# Patient Record
Sex: FEMALE | Race: OTHER | Employment: FULL TIME | URBAN - METROPOLITAN AREA
[De-identification: names, ages, dates, MRNs, and addresses within clinical notes are randomized per-mention and may not be internally consistent; named-entity substitution may affect disease eponyms.]

---

## 2017-03-22 ENCOUNTER — HOSPITAL ENCOUNTER (EMERGENCY)
Facility: HOSPITAL | Age: 37
Discharge: HOME | End: 2017-03-22
Attending: EMERGENCY MEDICINE
Payer: COMMERCIAL

## 2017-03-22 ENCOUNTER — APPOINTMENT (OUTPATIENT)
Dept: CT IMAGING | Facility: HOSPITAL | Age: 37
End: 2017-03-22
Attending: EMERGENCY MEDICINE
Payer: COMMERCIAL

## 2017-03-22 VITALS
BODY MASS INDEX: 23.39 KG/M2 | OXYGEN SATURATION: 99 % | WEIGHT: 137 LBS | HEART RATE: 83 BPM | DIASTOLIC BLOOD PRESSURE: 76 MMHG | HEIGHT: 64 IN | RESPIRATION RATE: 18 BRPM | SYSTOLIC BLOOD PRESSURE: 125 MMHG | TEMPERATURE: 101 F

## 2017-03-22 DIAGNOSIS — N30.91 HEMORRHAGIC CYSTITIS: Primary | ICD-10-CM

## 2017-03-22 LAB
B-HCG UR QL: NEGATIVE
BILIRUB UR QL: NEGATIVE
NITRITE UR QL STRIP.AUTO: POSITIVE
PH UR: 6 [PH] (ref 5–8)
PROT UR-MCNC: 100 MG/DL
RBC #/AREA URNS AUTO: ABNORMAL /HPF
SP GR UR STRIP: 1.02 (ref 1–1.03)
UROBILINOGEN UR STRIP-ACNC: <2
VIT C UR-MCNC: NEGATIVE MG/DL
WBC #/AREA URNS AUTO: 772 /HPF

## 2017-03-22 PROCEDURE — 87088 URINE BACTERIA CULTURE: CPT | Performed by: EMERGENCY MEDICINE

## 2017-03-22 PROCEDURE — 81025 URINE PREGNANCY TEST: CPT

## 2017-03-22 PROCEDURE — 81001 URINALYSIS AUTO W/SCOPE: CPT | Performed by: EMERGENCY MEDICINE

## 2017-03-22 PROCEDURE — 74176 CT ABD & PELVIS W/O CONTRAST: CPT

## 2017-03-22 PROCEDURE — 87086 URINE CULTURE/COLONY COUNT: CPT | Performed by: EMERGENCY MEDICINE

## 2017-03-22 PROCEDURE — 99284 EMERGENCY DEPT VISIT MOD MDM: CPT

## 2017-03-22 PROCEDURE — 87186 SC STD MICRODIL/AGAR DIL: CPT | Performed by: EMERGENCY MEDICINE

## 2017-03-22 RX ORDER — PHENAZOPYRIDINE HYDROCHLORIDE 100 MG/1
200 TABLET, FILM COATED ORAL 3 TIMES DAILY PRN
Qty: 6 TABLET | Refills: 0 | Status: SHIPPED | OUTPATIENT
Start: 2017-03-22 | End: 2017-03-29

## 2017-03-22 RX ORDER — SULFAMETHOXAZOLE AND TRIMETHOPRIM 800; 160 MG/1; MG/1
1 TABLET ORAL 2 TIMES DAILY
Qty: 14 TABLET | Refills: 0 | Status: SHIPPED | OUTPATIENT
Start: 2017-03-22 | End: 2017-04-01

## 2017-03-22 NOTE — ED PROVIDER NOTES
Patient Seen in: [facility] AND [facility] Emergency Department    History   Patient presents with:  Urinary Symptoms (urologic)    Stated Complaint: Blood in urination    HPI    Urinary urgency, burning and hematuria that began tonight. No fever. No vomiting.  Pratibha Lopes light.   Neck: Normal range of motion. Neck supple. Cardiovascular: Normal rate, regular rhythm, normal heart sounds and intact distal pulses. Pulmonary/Chest: Effort normal and breath sounds normal.   Abdominal: Soft.  Bowel sounds are normal. She exh

## 2017-03-28 ENCOUNTER — TELEPHONE (OUTPATIENT)
Dept: INTERNAL MEDICINE CLINIC | Facility: CLINIC | Age: 37
End: 2017-03-28

## 2017-03-28 NOTE — TELEPHONE ENCOUNTER
patient was in er last week for UTI symptoms  Was given medication she is all done she feel she still has the infection can she come in in Thursday to leave a urine sample ?  Please advice

## 2017-04-10 ENCOUNTER — TELEPHONE (OUTPATIENT)
Dept: INTERNAL MEDICINE CLINIC | Facility: CLINIC | Age: 37
End: 2017-04-10

## 2017-04-10 ENCOUNTER — OFFICE VISIT (OUTPATIENT)
Dept: INTERNAL MEDICINE CLINIC | Facility: CLINIC | Age: 37
End: 2017-04-10

## 2017-04-10 VITALS
TEMPERATURE: 98 F | DIASTOLIC BLOOD PRESSURE: 71 MMHG | BODY MASS INDEX: 28.33 KG/M2 | HEIGHT: 61.5 IN | WEIGHT: 152 LBS | HEART RATE: 77 BPM | SYSTOLIC BLOOD PRESSURE: 126 MMHG

## 2017-04-10 DIAGNOSIS — F32.A DEPRESSION, UNSPECIFIED DEPRESSION TYPE: ICD-10-CM

## 2017-04-10 DIAGNOSIS — R39.9 UTI SYMPTOMS: Primary | ICD-10-CM

## 2017-04-10 PROCEDURE — 81002 URINALYSIS NONAUTO W/O SCOPE: CPT | Performed by: INTERNAL MEDICINE

## 2017-04-10 PROCEDURE — 99214 OFFICE O/P EST MOD 30 MIN: CPT | Performed by: INTERNAL MEDICINE

## 2017-04-10 PROCEDURE — 99213 OFFICE O/P EST LOW 20 MIN: CPT | Performed by: INTERNAL MEDICINE

## 2017-04-10 RX ORDER — CITALOPRAM 10 MG/1
10 TABLET ORAL DAILY
Qty: 30 TABLET | Refills: 1 | Status: SHIPPED | OUTPATIENT
Start: 2017-04-10 | End: 2017-05-10

## 2017-04-10 RX ORDER — CEPHALEXIN 500 MG/1
500 CAPSULE ORAL
Refills: 0 | COMMUNITY
Start: 2017-03-24 | End: 2017-04-10 | Stop reason: ALTCHOICE

## 2017-04-10 NOTE — PROGRESS NOTES
HPI:   Gurpreet Yu is a 39year old female who presents for a complete physical exam. She states that she was in ed due to urinary infection    Wt Readings from Last 3 Encounters:  04/10/17 : 152 lb (68.947 kg)  03/22/17 : 137 lb (62.143 kg)  10/03/16 77  Temp(Src) 98 °F (36.7 °C) (Oral)  Ht 5' 1.5\" (1.562 m)  Wt 152 lb (68.947 kg)  BMI 28.26 kg/m2  LMP 03/11/2017  Body mass index is 28.26 kg/(m^2). Constitutional Normal Well developed.    Eyes Normal Pupil - Right: Normal, Left: Normal.   Ears Norm

## 2017-04-10 NOTE — PATIENT INSTRUCTIONS
Depression: Tips to Help Yourself  As your health care providers help treat your depression, you can also help yourself. Keep in mind that your illness affects you emotionally, physically, mentally, and socially. So full recovery will take time.  Take car © 8496-7095 The [address] [place] [address], [address] [place]. All rights reserved. This information is not intended as a substitute for professional medical care. Always follow your healthcare professional's instructions.         Depress · Take care of your physical body. Eat a balanced diet (low in saturated fat and high in fruits and vegetables). Exercise at least 3 times a week for 30 minutes. Even mild-moderate exercise (like brisk walking) can make you feel better.   · Avoid alcohol, w © 6308-7292 [address], [address] [place]. All rights reserved. This information is not intended as a substitute for professional medical care. Always follow your healthcare professional's instructions.

## 2017-04-10 NOTE — PROGRESS NOTES
HPI:    Patient ID: Anthony Franco is a 39year old female. HPI she came in today for follow-up on her UTI symptoms as well as some complaining of depression.       She states that the few weeks ago she was seen in the ED because of a urinary infection Negative for adenopathy. Does not bruise/bleed easily. Psychiatric/Behavioral: Negative for hallucinations, behavioral problems, confusion, sleep disturbance and agitation. The patient is not nervous/anxious.          Depressed           Current Outpatien present. No thyroid mass and no thyromegaly present. Cardiovascular: Normal rate, regular rhythm, normal heart sounds and intact distal pulses. Exam reveals no gallop and no friction rub. No murmur heard.   Pulmonary/Chest: Effort normal and breath so Referrals:  None        XT#[id number]

## 2017-08-04 ENCOUNTER — HOSPITAL ENCOUNTER (OUTPATIENT)
Dept: GENERAL RADIOLOGY | Facility: HOSPITAL | Age: 37
Discharge: HOME | End: 2017-08-04
Attending: INTERNAL MEDICINE
Payer: COMMERCIAL

## 2017-08-04 ENCOUNTER — OFFICE VISIT (OUTPATIENT)
Dept: INTERNAL MEDICINE CLINIC | Facility: CLINIC | Age: 37
End: 2017-08-04

## 2017-08-04 VITALS
DIASTOLIC BLOOD PRESSURE: 77 MMHG | HEART RATE: 65 BPM | SYSTOLIC BLOOD PRESSURE: 122 MMHG | BODY MASS INDEX: 28.33 KG/M2 | WEIGHT: 152 LBS | TEMPERATURE: 98 F | HEIGHT: 61.5 IN

## 2017-08-04 DIAGNOSIS — G44.51 HEMICRANIA CONTINUA: Primary | ICD-10-CM

## 2017-08-04 DIAGNOSIS — G44.51 HEMICRANIA CONTINUA: ICD-10-CM

## 2017-08-04 PROCEDURE — 70210 X-RAY EXAM OF SINUSES: CPT | Performed by: INTERNAL MEDICINE

## 2017-08-04 PROCEDURE — 99212 OFFICE O/P EST SF 10 MIN: CPT | Performed by: INTERNAL MEDICINE

## 2017-08-04 PROCEDURE — 99214 OFFICE O/P EST MOD 30 MIN: CPT | Performed by: INTERNAL MEDICINE

## 2017-08-04 RX ORDER — NAPROXEN 500 MG/1
500 TABLET ORAL 2 TIMES DAILY WITH MEALS
Qty: 60 TABLET | Refills: 0 | Status: ON HOLD | OUTPATIENT
Start: 2017-08-04 | End: 2018-12-18

## 2017-08-04 NOTE — PROGRESS NOTES
HPI:    Patient ID: Kathi Altamriano is a 40year old female. HPI she came today complaining of headache.   She states that for last couple of months she is having on and off his headaches usually comes before her periods   Lasts a few days and then goes light-headedness, numbness and headaches. Hematological: Negative for adenopathy. Does not bruise/bleed easily. Psychiatric/Behavioral: Negative for agitation, behavioral problems, confusion, hallucinations and sleep disturbance.  The patient is not ner motion. Neck supple. No JVD present. No tracheal tenderness present. No tracheal deviation present. No thyroid mass and no thyromegaly present. Cardiovascular: Normal rate, regular rhythm, normal heart sounds and intact distal pulses.   Exam reveals no ga

## 2017-08-04 NOTE — PATIENT INSTRUCTIONS
Cómo cuidarse los princess de [place]  La mayoría de los princess de [place] no son serios y pueden aliviarse cuidándose usted mismo.  Buddy, algunos pueden ser señal de otros problemas, diane trastornos de la vista o jarrod presión arterial. Para encontrar el me · Trate de identificar qué cosas le desencadenan el dolor de yaz o cierto patrón similar en cada ocasión  Signos de un dolor de yaz por tensión  Cualquiera de las siguientes cosas pueden ser signos:  · Dolor sordo o sensación de presión diane si tuvie Un dolor de yaz puede deberse a muchas cosas. No queda linda a qué se debe pearson dolor de yaz. Buddy no parece ser signo de ninguna enfermedad grave. Puede que tenga un dolor de yaz tensional o un dolor de yaz por migraña.   El estrés puede causar · Si tiene náuseas o vómito, siga kain dieta liviana hasta tanto el dolor de yaz se [facility] [place] [facility].   · Si tiene un dolor de yaz por migraña, use lentes de sol cuando salga a la belkys del día o se encuentre en lugares interiores con iluminación brillante, hasta

## 2017-09-18 ENCOUNTER — TELEPHONE (OUTPATIENT)
Dept: INTERNAL MEDICINE CLINIC | Facility: CLINIC | Age: 37
End: 2017-09-18

## 2017-09-18 NOTE — TELEPHONE ENCOUNTER
Pt c/o sore throat 3 days and headache. States sore throat has gotten worse. Pt denies any fevers, cough, or SOB, or CP. Pt is in no apparent distress. States has drank teas to help sooth throat, but is no longer helping.  Appt offered, but pt denied state

## 2017-09-18 NOTE — TELEPHONE ENCOUNTER
Spoke to pt. Relayed pt of Dr. Blanco Setting message as shown below. Pt educated on route, dose and frequency of medication. Pt verbalized understanding of whole message and had no further questions at this time.

## 2017-09-18 NOTE — TELEPHONE ENCOUNTER
Pt been having a sore throat for 3 days now pt is also complaining of a headache like in rx called in.

## 2017-09-18 NOTE — TELEPHONE ENCOUNTER
I send amoxiclav take as directed with food til done. Take probiotics while on antibiotics if can to prevent yeast infections. Stop cholesterol medicines while on antibiotics. Increase fluids. Use extra form of protection while on antibiotics.  Antibiotics

## 2018-07-21 ENCOUNTER — HOSPITAL ENCOUNTER (OUTPATIENT)
Dept: GENERAL RADIOLOGY | Facility: HOSPITAL | Age: 38
Discharge: HOME | End: 2018-07-21
Attending: INTERNAL MEDICINE
Payer: COMMERCIAL

## 2018-07-21 ENCOUNTER — OFFICE VISIT (OUTPATIENT)
Dept: INTERNAL MEDICINE CLINIC | Facility: CLINIC | Age: 38
End: 2018-07-21

## 2018-07-21 ENCOUNTER — TELEPHONE (OUTPATIENT)
Dept: INTERNAL MEDICINE CLINIC | Facility: CLINIC | Age: 38
End: 2018-07-21

## 2018-07-21 VITALS
TEMPERATURE: 98 F | HEART RATE: 72 BPM | WEIGHT: 154 LBS | DIASTOLIC BLOOD PRESSURE: 62 MMHG | OXYGEN SATURATION: 98 % | BODY MASS INDEX: 28.7 KG/M2 | HEIGHT: 61.5 IN | SYSTOLIC BLOOD PRESSURE: 104 MMHG

## 2018-07-21 DIAGNOSIS — N93.8 DUB (DYSFUNCTIONAL UTERINE BLEEDING): ICD-10-CM

## 2018-07-21 DIAGNOSIS — M54.50 ACUTE LEFT-SIDED LOW BACK PAIN WITHOUT SCIATICA: ICD-10-CM

## 2018-07-21 DIAGNOSIS — R19.7 DIARRHEA, UNSPECIFIED TYPE: Primary | ICD-10-CM

## 2018-07-21 LAB
ALBUMIN SERPL BCP-MCNC: 4.1 G/DL (ref 3.5–4.8)
ALBUMIN/GLOB SERPL: 1.2 {RATIO} (ref 1–2)
ALP SERPL-CCNC: 69 U/L (ref 32–100)
ALT SERPL-CCNC: 26 U/L (ref 14–54)
ANION GAP SERPL CALC-SCNC: 7 MMOL/L (ref 0–18)
AST SERPL-CCNC: 27 U/L (ref 15–41)
BASOPHILS # BLD: 0.1 K/UL (ref 0–0.2)
BASOPHILS NFR BLD: 1 %
BILIRUB SERPL-MCNC: 0.4 MG/DL (ref 0.3–1.2)
BUN SERPL-MCNC: 8 MG/DL (ref 8–20)
BUN/CREAT SERPL: 10.5 (ref 10–20)
CALCIUM SERPL-MCNC: 9.2 MG/DL (ref 8.5–10.5)
CHLORIDE SERPL-SCNC: 107 MMOL/L (ref 95–110)
CO2 SERPL-SCNC: 26 MMOL/L (ref 22–32)
CREAT SERPL-MCNC: 0.76 MG/DL (ref 0.5–1.5)
EOSINOPHIL # BLD: 0 K/UL (ref 0–0.7)
EOSINOPHIL NFR BLD: 1 %
ERYTHROCYTE [DISTWIDTH] IN BLOOD BY AUTOMATED COUNT: 15.7 % (ref 11–15)
FSH SERPL-ACNC: 9.9 MIU/ML
GLOBULIN PLAS-MCNC: 3.4 G/DL (ref 2.5–3.7)
GLUCOSE SERPL-MCNC: 73 MG/DL (ref 70–99)
HCT VFR BLD AUTO: 37.2 % (ref 35–48)
HGB BLD-MCNC: 11.8 G/DL (ref 12–16)
INR BLD: 1 (ref 0.9–1.2)
LH SERPL-ACNC: 4.9 MIU/ML
LYMPHOCYTES # BLD: 1.5 K/UL (ref 1–4)
LYMPHOCYTES NFR BLD: 22 %
MCH RBC QN AUTO: 25.3 PG (ref 27–32)
MCHC RBC AUTO-ENTMCNC: 31.8 G/DL (ref 32–37)
MCV RBC AUTO: 79.5 FL (ref 80–100)
MONOCYTES # BLD: 0.5 K/UL (ref 0–1)
MONOCYTES NFR BLD: 7 %
NEUTROPHILS # BLD AUTO: 4.7 K/UL (ref 1.8–7.7)
NEUTROPHILS NFR BLD: 69 %
OSMOLALITY UR CALC.SUM OF ELEC: 287 MOSM/KG (ref 275–295)
PATIENT FASTING: NO
PLATELET # BLD AUTO: 294 K/UL (ref 140–400)
PMV BLD AUTO: 9.1 FL (ref 7.4–10.3)
POTASSIUM SERPL-SCNC: 4 MMOL/L (ref 3.3–5.1)
PROT SERPL-MCNC: 7.5 G/DL (ref 5.9–8.4)
PROTHROMBIN TIME: 13.3 SECONDS (ref 11.8–14.5)
RBC # BLD AUTO: 4.68 M/UL (ref 3.7–5.4)
SODIUM SERPL-SCNC: 140 MMOL/L (ref 136–144)
TSH SERPL-ACNC: 2.18 UIU/ML (ref 0.45–5.33)
WBC # BLD AUTO: 6.8 K/UL (ref 4–11)

## 2018-07-21 PROCEDURE — 72100 X-RAY EXAM L-S SPINE 2/3 VWS: CPT | Performed by: INTERNAL MEDICINE

## 2018-07-21 PROCEDURE — 99212 OFFICE O/P EST SF 10 MIN: CPT | Performed by: INTERNAL MEDICINE

## 2018-07-21 PROCEDURE — 96372 THER/PROPH/DIAG INJ SC/IM: CPT | Performed by: INTERNAL MEDICINE

## 2018-07-21 PROCEDURE — 99214 OFFICE O/P EST MOD 30 MIN: CPT | Performed by: INTERNAL MEDICINE

## 2018-07-21 PROCEDURE — 36415 COLL VENOUS BLD VENIPUNCTURE: CPT | Performed by: INTERNAL MEDICINE

## 2018-07-21 RX ORDER — CYCLOBENZAPRINE HCL 10 MG
10 TABLET ORAL NIGHTLY
Qty: 5 TABLET | Refills: 0 | Status: SHIPPED | OUTPATIENT
Start: 2018-07-21 | End: 2018-07-26

## 2018-07-21 RX ORDER — KETOROLAC TROMETHAMINE 30 MG/ML
30 INJECTION, SOLUTION INTRAMUSCULAR; INTRAVENOUS ONCE
Status: COMPLETED | OUTPATIENT
Start: 2018-07-21 | End: 2018-07-21

## 2018-07-21 RX ORDER — KETOROLAC TROMETHAMINE 10 MG/1
10 TABLET, FILM COATED ORAL EVERY 8 HOURS
Qty: 15 TABLET | Refills: 0 | Status: ON HOLD | OUTPATIENT
Start: 2018-07-21 | End: 2018-12-18

## 2018-07-21 RX ADMIN — KETOROLAC TROMETHAMINE 30 MG: 30 INJECTION, SOLUTION INTRAMUSCULAR; INTRAVENOUS at 12:39:00

## 2018-07-21 NOTE — TELEPHONE ENCOUNTER
Pain c/o low pain pain on the Lt side, difficulties breathing due to the back pain, menstrual cycle started on 07/16 ans lasts 6 days but instead blood flow is still very heavy and she she should almost be done.  Stated that last night she was excessively b

## 2018-07-21 NOTE — PATIENT INSTRUCTIONS
ASSESSMENT/PLAN:   Diarrhea, unspecified type  (primary encounter diagnosis)Use immodium as needed. But careful not too much. Wash hands thoroughly. BRAT (Bananas, rice apples, tea, bread, etc). [place] diet. Avoid spicey foods. Avoid dairy for 1 week.  OK fo los síntomas del dolor de espalda crónico:  · Mantenga un peso saludable. Si tiene sobrepeso, perder [employer] a aliviar la mayoría de los princess de [place].   · El ejercicio es kain parte importante de la recuperación de los princess de espalda. La es alguna vez kain úlcera estomacal o sangrado gastrointestinal, consulte con pearson médico antes de simon estos medicamentos. ]     Estiramiento Lumbar  Josee es un ejercicio simple de estiramiento que le ayudará a relajar los espasmos musculares y a mantener la es dobladas. Apoye la yaz en kain almohada baja para jewell soporte al dorinda de forma que la columna vertebral quede en posición neutral. Evite las almohadas demasiado gruesas que doblan el dorinda hacia un lado.  Ponga kain OUMOU's duke Waldron naturales  La columna vertebral contiene huesos (vértebras) y almohadillas de tejido blando (discos) dispuestos en josie curvas: cervical, torácica y lumbar.  Si están grace alineadas, estas curvas mantienen el equilibrio del cuerpo y lo sostienen ann el continuación le ayudarán a [employer] de la parte baja de la espalda.  Es importante que comience a hacer ejercicios lentamente y que aumente los niveles de intensidad poco a poco.  Comience siempre cualquier programa de ejercicios estirando lo hasta que estén a unos cuantos centímetros del piso. Sostenga esta posición por 5 segundos y relájese.)  3. Inclinación pélvica: Acuéstese boca arriba en el suelo, con las rodillas flexionadas a 90 grados. Deje los pies apoyados contra el piso.  Pina Bazan, es amanda espasmos musculares (muscle spasm) y ello aumenta el dolor. El dolor de espalda wolfgang suele mejorar en kain a Kraig.  El dolor de espalda relacionado con alguna afección de los discos (disk disease), artritis (arthritis) en las articulaciones ve Emplee el método que mejor le resulte. 5. Puede usar acetaminofén (acetaminophen) [Tylenol] o ibuprofeno (ibuprofen) [Motrin o Advil] para controlar el dolor, a menos que le hayan recetado otro medicamento.  [NOTA: Si tiene kain enfermedad hepática o renal el tiempo. Para el cuidado a itzel plazo de pearson Richard Blacksmith con regularidad, pierda el exceso de peso y aprenda a mantener kain buena postura.   [place] un descanso corto  Si el dolor es muy minna y aumenta al estar sentado o de pie, puede resultarl el dolor no disminuye al cabo de Serge. Date Last Reviewed: 9/29/2015  © 8498-3769 The [address]. [address] [facility], [address] [place] [address]. Todos los derechos reservados.  Esta información no pretende sustituir la atención médica profesi pretende sustituir la atención médica profesional. Sólo pearson médico puede diagnosticar y tratar un problema de curtis. Cómo aliviar el dolor de espalda  El dolor de espalda es un problema común.  Los músculos de la espalda se pueden tensionar si usted l un problema de curtis. Seguridad de la espalda: La chacorta postura es perjudicial  Las afecciones de la espalda suelen comenzar con malos hábitos. Moverse de [place] y adoptar malas posturas son causas comunes del dolor de espalda.  Marissa Uribe calor antes de moverse, y [place] de posición a menudo. Además, keyshawn lo posible por adoptar buenos hábitos. Entre en calor para comenzar el día  Al despertarse por las [place], estírese lentamente, diane [facility] gatos, unas cuantas veces.  Josee sencillo ej 19858. Todos los derechos reservados. Esta información no pretende sustituir la atención médica profesional. Sólo pearson médico puede diagnosticar y tratar un problema de curtis.         Cómo funciona la espalda  Odalys espalda wesley le permite doblarse y estirarse menudo; camine de un lado a otro, estírese, [place]. Además, pruebe los siguientes consejos. Aparte tiempo para relajarse. Comience reservando 5 minutos al día. Al respirar hondo, deje ensanchar el abdomen.     Respire hondo  Practicar res enderécese un poco, comenzando desde la parte superior de la yaz. Imagínese kain línea que le vincula las [place], los hombros, las caderas y los tobillos.  Amolde el cuerpo a everton línea; azam vez necesite relajar las caderas y  la pelvis ligeramente ha espalda lumbar grace acomodada contra la silla. Fije la vista en línea recta, sin doblar el dorinda. · Apoye las plantas de los pies sobre el suelo o en un reposapiés. Mantenga las rodillas al [facility] caderas.   · Ajuste la altura de la silla seg (sin arquearla ni aflojarla). Asegúrese de [employer] curva natural del dorinda. · Contraiga los músculos abdominales y glúteos para empujar la espalda [place]. Deje caer la yaz ligeramente. · Sostenga la posición ann 5 segundos.  Darleene Coffin a la ann 20 segundos. · Luego cambie de lado lentamente. · Repita esto de 2 a 5 veces.        Date Last Reviewed: 10/11/2015  © 3903-6785 The [address]. 1407 [facility], [address] [place], [place] [address]ule[address]. Todos los derechos reservados.  Esta información [address], [place], [address] [facility]. Todos los derechos reservados. Esta información no pretende sustituir la atención médica profesional. Sólo pearson médico puede diagnosticar y tratar un problema de curtis.         Ejercicios para la espald

## 2018-07-21 NOTE — PROGRESS NOTES
HPI:    Patient ID: Victorina Reyes is a 45year old female. Last week, diarrhea (no blood, watery) and N,V X 2 days. No restuarants. No sick contacts. No travel. But found out later, people sick at work. Felt weak, thus not at gym.        Back Pain   Th throat, trouble swallowing and voice change. Respiratory: Negative for apnea, cough, choking, chest tightness, shortness of breath, wheezing and stridor. Cardiovascular: Negative for chest pain, palpitations and leg swelling.    Gastrointestinal: Posi PHYSICAL EXAM:    Physical Exam   Constitutional: She is oriented to person, place, and time. She appears well-developed and well-nourished. No distress.    HENT:   Mouth/Throat: Uvula is midline, oropharynx is clear and moist and mucous membranes are n tenderness found.         Left knee: She exhibits normal range of motion, no swelling, no effusion, no ecchymosis, no deformity, no laceration, no erythema, normal alignment, no LCL laxity, normal patellar mobility, no bony tenderness, normal meniscus and n completely to lift something from side. Back exercises. Correct posture when sitting with feet flat on floor and back against chair and computer at eye level. Check Xrays. Toradol shot now.   And then take Toradol pill starting with dinner tonight and 3 arben

## 2018-07-22 PROBLEM — D64.9 ANEMIA: Status: ACTIVE | Noted: 2018-07-22

## 2018-07-22 NOTE — PROGRESS NOTES
CBC Normal (white blood cells and platelets), but slight anemia. Added some more blood work on. CMP Normal (electrolytes, sugar, kidney and liver functions),   Thyroid is good. Hormone levels are okay.

## 2018-07-23 ENCOUNTER — TELEPHONE (OUTPATIENT)
Dept: INTERNAL MEDICINE CLINIC | Facility: CLINIC | Age: 38
End: 2018-07-23

## 2018-07-23 LAB
HAV AB SER QL IA: REACTIVE
HAV IGM SERPL QL IA: NONREACTIVE
HBV CORE AB SERPL QL IA: NONREACTIVE
HBV SURFACE AB SER-ACNC: 399.26 MIU/ML (ref ?–10)
HBV SURFACE AG SERPL QL IA: NONREACTIVE
HBV SURFACE AG SERPL QL IA: REACTIVE
HCV AB SERPL QL IA: NONREACTIVE

## 2018-07-23 NOTE — TELEPHONE ENCOUNTER
Pt asking about X-ray result, it states \"abnormal workouts and ab strengthening recommended\" please advise.

## 2018-07-24 LAB — VON WILLEBRAND FACTOR ACTIVITY: 89 %

## 2018-12-16 ENCOUNTER — ANESTHESIA (OUTPATIENT)
Dept: SURGERY | Facility: HOSPITAL | Age: 38
DRG: 343 | End: 2018-12-16
Payer: COMMERCIAL

## 2018-12-16 ENCOUNTER — HOSPITAL ENCOUNTER (INPATIENT)
Facility: HOSPITAL | Age: 38
LOS: 2 days | Discharge: HOME | DRG: 343 | End: 2018-12-18
Attending: EMERGENCY MEDICINE | Admitting: HOSPITALIST
Payer: COMMERCIAL

## 2018-12-16 ENCOUNTER — ANESTHESIA EVENT (OUTPATIENT)
Dept: SURGERY | Facility: HOSPITAL | Age: 38
DRG: 343 | End: 2018-12-16
Payer: COMMERCIAL

## 2018-12-16 ENCOUNTER — APPOINTMENT (OUTPATIENT)
Dept: CT IMAGING | Facility: HOSPITAL | Age: 38
DRG: 343 | End: 2018-12-16
Attending: EMERGENCY MEDICINE
Payer: COMMERCIAL

## 2018-12-16 DIAGNOSIS — K35.30 ACUTE APPENDICITIS WITH LOCALIZED PERITONITIS, WITHOUT PERFORATION, ABSCESS, OR GANGRENE: Primary | ICD-10-CM

## 2018-12-16 DIAGNOSIS — K35.80 ACUTE APPENDICITIS: ICD-10-CM

## 2018-12-16 PROCEDURE — 0DTJ4ZZ RESECTION OF APPENDIX, PERCUTANEOUS ENDOSCOPIC APPROACH: ICD-10-PCS | Performed by: COLON & RECTAL SURGERY

## 2018-12-16 PROCEDURE — 74177 CT ABD & PELVIS W/CONTRAST: CPT | Performed by: EMERGENCY MEDICINE

## 2018-12-16 PROCEDURE — 99222 1ST HOSP IP/OBS MODERATE 55: CPT | Performed by: HOSPITALIST

## 2018-12-16 RX ORDER — ONDANSETRON 2 MG/ML
4 INJECTION INTRAMUSCULAR; INTRAVENOUS ONCE AS NEEDED
Status: DISCONTINUED | OUTPATIENT
Start: 2018-12-16 | End: 2018-12-16 | Stop reason: HOSPADM

## 2018-12-16 RX ORDER — ONDANSETRON 2 MG/ML
4 INJECTION INTRAMUSCULAR; INTRAVENOUS EVERY 6 HOURS PRN
Status: DISCONTINUED | OUTPATIENT
Start: 2018-12-16 | End: 2018-12-18

## 2018-12-16 RX ORDER — SODIUM CHLORIDE, SODIUM LACTATE, POTASSIUM CHLORIDE, CALCIUM CHLORIDE 600; 310; 30; 20 MG/100ML; MG/100ML; MG/100ML; MG/100ML
INJECTION, SOLUTION INTRAVENOUS CONTINUOUS PRN
Status: DISCONTINUED | OUTPATIENT
Start: 2018-12-16 | End: 2018-12-16 | Stop reason: SURG

## 2018-12-16 RX ORDER — MIDAZOLAM HYDROCHLORIDE 1 MG/ML
INJECTION INTRAMUSCULAR; INTRAVENOUS AS NEEDED
Status: DISCONTINUED | OUTPATIENT
Start: 2018-12-16 | End: 2018-12-16 | Stop reason: SURG

## 2018-12-16 RX ORDER — MORPHINE SULFATE 10 MG/ML
6 INJECTION, SOLUTION INTRAMUSCULAR; INTRAVENOUS EVERY 10 MIN PRN
Status: DISCONTINUED | OUTPATIENT
Start: 2018-12-16 | End: 2018-12-16 | Stop reason: HOSPADM

## 2018-12-16 RX ORDER — BUPIVACAINE HYDROCHLORIDE 2.5 MG/ML
INJECTION, SOLUTION EPIDURAL; INFILTRATION; INTRACAUDAL AS NEEDED
Status: DISCONTINUED | OUTPATIENT
Start: 2018-12-16 | End: 2018-12-16 | Stop reason: HOSPADM

## 2018-12-16 RX ORDER — NALOXONE HYDROCHLORIDE 0.4 MG/ML
80 INJECTION, SOLUTION INTRAMUSCULAR; INTRAVENOUS; SUBCUTANEOUS AS NEEDED
Status: DISCONTINUED | OUTPATIENT
Start: 2018-12-16 | End: 2018-12-16 | Stop reason: HOSPADM

## 2018-12-16 RX ORDER — BISACODYL 10 MG
10 SUPPOSITORY, RECTAL RECTAL
Status: DISCONTINUED | OUTPATIENT
Start: 2018-12-16 | End: 2018-12-18

## 2018-12-16 RX ORDER — ROCURONIUM BROMIDE 10 MG/ML
INJECTION, SOLUTION INTRAVENOUS AS NEEDED
Status: DISCONTINUED | OUTPATIENT
Start: 2018-12-16 | End: 2018-12-16 | Stop reason: SURG

## 2018-12-16 RX ORDER — HYDROCODONE BITARTRATE AND ACETAMINOPHEN 5; 325 MG/1; MG/1
2 TABLET ORAL EVERY 4 HOURS PRN
Status: DISCONTINUED | OUTPATIENT
Start: 2018-12-16 | End: 2018-12-18

## 2018-12-16 RX ORDER — SODIUM CHLORIDE 9 MG/ML
INJECTION, SOLUTION INTRAVENOUS CONTINUOUS
Status: DISCONTINUED | OUTPATIENT
Start: 2018-12-16 | End: 2018-12-18

## 2018-12-16 RX ORDER — MORPHINE SULFATE 2 MG/ML
1 INJECTION, SOLUTION INTRAMUSCULAR; INTRAVENOUS EVERY 2 HOUR PRN
Status: DISCONTINUED | OUTPATIENT
Start: 2018-12-16 | End: 2018-12-18

## 2018-12-16 RX ORDER — HYDROCODONE BITARTRATE AND ACETAMINOPHEN 5; 325 MG/1; MG/1
1 TABLET ORAL AS NEEDED
Status: DISCONTINUED | OUTPATIENT
Start: 2018-12-16 | End: 2018-12-16 | Stop reason: HOSPADM

## 2018-12-16 RX ORDER — KETOROLAC TROMETHAMINE 30 MG/ML
30 INJECTION, SOLUTION INTRAMUSCULAR; INTRAVENOUS EVERY 6 HOURS PRN
Status: DISPENSED | OUTPATIENT
Start: 2018-12-16 | End: 2018-12-18

## 2018-12-16 RX ORDER — DOCUSATE SODIUM 100 MG/1
100 CAPSULE, LIQUID FILLED ORAL 2 TIMES DAILY
Status: DISCONTINUED | OUTPATIENT
Start: 2018-12-16 | End: 2018-12-18

## 2018-12-16 RX ORDER — LIDOCAINE HYDROCHLORIDE 10 MG/ML
INJECTION, SOLUTION EPIDURAL; INFILTRATION; INTRACAUDAL; PERINEURAL AS NEEDED
Status: DISCONTINUED | OUTPATIENT
Start: 2018-12-16 | End: 2018-12-16 | Stop reason: SURG

## 2018-12-16 RX ORDER — MORPHINE SULFATE 4 MG/ML
2 INJECTION, SOLUTION INTRAMUSCULAR; INTRAVENOUS EVERY 10 MIN PRN
Status: DISCONTINUED | OUTPATIENT
Start: 2018-12-16 | End: 2018-12-16 | Stop reason: HOSPADM

## 2018-12-16 RX ORDER — ONDANSETRON 2 MG/ML
4 INJECTION INTRAMUSCULAR; INTRAVENOUS ONCE
Status: DISCONTINUED | OUTPATIENT
Start: 2018-12-16 | End: 2018-12-18

## 2018-12-16 RX ORDER — SODIUM CHLORIDE 0.9 % (FLUSH) 0.9 %
3 SYRINGE (ML) INJECTION AS NEEDED
Status: DISCONTINUED | OUTPATIENT
Start: 2018-12-16 | End: 2018-12-18

## 2018-12-16 RX ORDER — MORPHINE SULFATE 2 MG/ML
2 INJECTION, SOLUTION INTRAMUSCULAR; INTRAVENOUS EVERY 2 HOUR PRN
Status: DISCONTINUED | OUTPATIENT
Start: 2018-12-16 | End: 2018-12-18

## 2018-12-16 RX ORDER — HALOPERIDOL 5 MG/ML
0.25 INJECTION INTRAMUSCULAR ONCE AS NEEDED
Status: DISCONTINUED | OUTPATIENT
Start: 2018-12-16 | End: 2018-12-16 | Stop reason: HOSPADM

## 2018-12-16 RX ORDER — HYDROCODONE BITARTRATE AND ACETAMINOPHEN 5; 325 MG/1; MG/1
1 TABLET ORAL EVERY 4 HOURS PRN
Status: DISCONTINUED | OUTPATIENT
Start: 2018-12-16 | End: 2018-12-18

## 2018-12-16 RX ORDER — ONDANSETRON 2 MG/ML
4 INJECTION INTRAMUSCULAR; INTRAVENOUS ONCE
Status: COMPLETED | OUTPATIENT
Start: 2018-12-16 | End: 2018-12-16

## 2018-12-16 RX ORDER — MORPHINE SULFATE 4 MG/ML
4 INJECTION, SOLUTION INTRAMUSCULAR; INTRAVENOUS EVERY 10 MIN PRN
Status: DISCONTINUED | OUTPATIENT
Start: 2018-12-16 | End: 2018-12-16 | Stop reason: HOSPADM

## 2018-12-16 RX ORDER — SODIUM CHLORIDE, SODIUM LACTATE, POTASSIUM CHLORIDE, CALCIUM CHLORIDE 600; 310; 30; 20 MG/100ML; MG/100ML; MG/100ML; MG/100ML
INJECTION, SOLUTION INTRAVENOUS CONTINUOUS
Status: DISCONTINUED | OUTPATIENT
Start: 2018-12-16 | End: 2018-12-16 | Stop reason: HOSPADM

## 2018-12-16 RX ORDER — MORPHINE SULFATE 4 MG/ML
4 INJECTION, SOLUTION INTRAMUSCULAR; INTRAVENOUS ONCE
Status: COMPLETED | OUTPATIENT
Start: 2018-12-16 | End: 2018-12-16

## 2018-12-16 RX ORDER — SODIUM PHOSPHATE, DIBASIC AND SODIUM PHOSPHATE, MONOBASIC 7; 19 G/133ML; G/133ML
1 ENEMA RECTAL ONCE AS NEEDED
Status: DISCONTINUED | OUTPATIENT
Start: 2018-12-16 | End: 2018-12-18

## 2018-12-16 RX ORDER — MORPHINE SULFATE 4 MG/ML
4 INJECTION, SOLUTION INTRAMUSCULAR; INTRAVENOUS EVERY 2 HOUR PRN
Status: DISCONTINUED | OUTPATIENT
Start: 2018-12-16 | End: 2018-12-18

## 2018-12-16 RX ORDER — POLYETHYLENE GLYCOL 3350 17 G/17G
17 POWDER, FOR SOLUTION ORAL DAILY PRN
Status: DISCONTINUED | OUTPATIENT
Start: 2018-12-16 | End: 2018-12-18

## 2018-12-16 RX ORDER — HYDROCODONE BITARTRATE AND ACETAMINOPHEN 5; 325 MG/1; MG/1
2 TABLET ORAL AS NEEDED
Status: DISCONTINUED | OUTPATIENT
Start: 2018-12-16 | End: 2018-12-16 | Stop reason: HOSPADM

## 2018-12-16 RX ORDER — KETOROLAC TROMETHAMINE 15 MG/ML
15 INJECTION, SOLUTION INTRAMUSCULAR; INTRAVENOUS EVERY 6 HOURS PRN
Status: DISPENSED | OUTPATIENT
Start: 2018-12-16 | End: 2018-12-18

## 2018-12-16 RX ADMIN — SODIUM CHLORIDE, SODIUM LACTATE, POTASSIUM CHLORIDE, CALCIUM CHLORIDE: 600; 310; 30; 20 INJECTION, SOLUTION INTRAVENOUS at 12:45:00

## 2018-12-16 RX ADMIN — SODIUM CHLORIDE, SODIUM LACTATE, POTASSIUM CHLORIDE, CALCIUM CHLORIDE: 600; 310; 30; 20 INJECTION, SOLUTION INTRAVENOUS at 11:29:00

## 2018-12-16 RX ADMIN — MIDAZOLAM HYDROCHLORIDE 2 MG: 1 INJECTION INTRAMUSCULAR; INTRAVENOUS at 11:29:00

## 2018-12-16 RX ADMIN — ROCURONIUM BROMIDE 10 MG: 10 INJECTION, SOLUTION INTRAVENOUS at 11:31:00

## 2018-12-16 RX ADMIN — SODIUM CHLORIDE, SODIUM LACTATE, POTASSIUM CHLORIDE, CALCIUM CHLORIDE: 600; 310; 30; 20 INJECTION, SOLUTION INTRAVENOUS at 11:45:00

## 2018-12-16 RX ADMIN — LIDOCAINE HYDROCHLORIDE 25 MG: 10 INJECTION, SOLUTION EPIDURAL; INFILTRATION; INTRACAUDAL; PERINEURAL at 11:31:00

## 2018-12-16 NOTE — ANESTHESIA POSTPROCEDURE EVALUATION
Patient: Dino Cheng    Procedure Summary     Date:  12/16/18 Room / Location:  [facility] OR  / [facility] OR    Anesthesia Start:  8048 Anesthesia Stop:  7128    Procedure:  LAPAROSCOPIC APPENDECTOMY (N/A Abdomen) Diagnosis:       Acute appendicitis

## 2018-12-16 NOTE — OPERATIVE REPORT
Operative Note    Patient Name: Jovanny Partida    Preoperative Diagnosis: Acute appendicitis [K35.80]    Postoperative Diagnosis: same    Primary Surgeon: Jolly Ureña    Assistant: Carlito Pascal MD, [facility]    Procedures: Laparoscopic appendectomy    Surgical right upper quadrant under direct visualization and there was no damage to the bowel underneath or any bleeding coming through the abdominal wall once the port was placed.  A 5 mm port was placed in the left lower quadrant, again under direct visualization Once the procedure was completed, the abdomen was evacuated of all gas. Attention was then turned to closing our port sites.  The previously placed 0 Vicryl stitch at the umbilical was tied down with care not to involve any bowel or omentum below, and t

## 2018-12-16 NOTE — ED PROVIDER NOTES
Patient Seen in: [facility] AND [facility] Emergency Department    History   Patient presents with:  Abdominal Pain    Stated Complaint: abdominal pain    HPI    19-year-old female without significant past medical history presents with complaints of abdominal pa rhythm  Gastrointestinal:  abdomen is soft with tenderness to palpation to the right side. The tenderness is most pronounced to the right lower quadrant, no masses, bowel sounds normal  Neurological: Speech normal.  Moving extremities equally x4.   Skin: w surgical evaluation and treatment of acute appendicitis. Discussed with Dr. Bob Conti, the patient's primary physician.   Also discussed with Dr. Asher Marie, general surgery  Admission disposition: 12/16/2018  8:43 AM         Patient is an IHP and needs to be admit

## 2018-12-16 NOTE — CONSULTS
[facility]    Report of Consultation    Kathi Altamirano Patient Status:  Inpatient    6/15/1980 MRN [id number]   Location [facility] PRE OP RECOVERY Attending Merle Ervin MD   Hosp Day # 0 PCP Tigre Velasco MD     Date of 2 tablet Oral PRN   fentaNYL citrate (SUBLIMAZE) 0.05 MG/ML injection 25 mcg 25 mcg Intravenous Q5 Min PRN   fentaNYL citrate (SUBLIMAZE) 0.05 MG/ML injection 50 mcg 50 mcg Intravenous Q5 Min PRN   morphINE sulfate (PF) 4 MG/ML injection 2 mg 2 mg Intraven breathing, good air exchange, clear to auscultation bilaterally, no crackles or wheezing  CARDIOVASCULAR:  Normal apical impulse, regular rate and rhythm, and no murmur noted, no pedal edema  ABDOMEN:    No scars, normal bowel sounds, soft, non-distended, in the care of your patient.     Emre Kuo MD    12/16/2018

## 2018-12-16 NOTE — PLAN OF CARE
Problem: Patient/Family Goals  Goal: Patient/Family Long Term Goal  Patient's Long Term Goal: Go home    Interventions:  -Have fast recovery  - See additional Care Plan goals for specific interventions  Outcome: Progressing    Goal: Patient/Family Short Te physician/LIP order or complex needs related to functional status, cognitive ability or social support system  Outcome: Progressing      Problem: GASTROINTESTINAL - ADULT  Goal: Minimal or absence of nausea and vomiting  INTERVENTIONS:  - Maintain adequate

## 2018-12-16 NOTE — ANESTHESIA PREPROCEDURE EVALUATION
Anesthesia PreOp Note    HPI:     Aldair Gaines is a 45year old female who presents for preoperative consultation requested by: Hien Hobson MD    Date of Surgery: 12/16/2018    Procedure(s):  LAPAROSCOPIC APPENDECTOMY  Indication: Acute appendicitis [K Other      Social History    Socioeconomic History      Marital status:       Spouse name: Not on file      Number of children: 0      Years of education: Not on file      Highest education level: Not on file    Social Needs      Financial resource 95. Her respiration is 16 and oxygen saturation is 99%.     12/16/18  0730 12/16/18  0745 12/16/18  0915 12/16/18  1007   BP:   135/70 134/75   BP Location:    Right arm   Pulse: 72 71 87 95   Resp:   14 16   Temp:    98.8 °F (37.1 °C)   TempSrc:    Oral

## 2018-12-16 NOTE — ANESTHESIA PROCEDURE NOTES
ANESTHESIA INTUBATION  Date/Time: 12/16/2018 11:40 AM  Urgency: elective      General Information and Staff    Patient location during procedure: OR  Anesthesiologist: Erik Franklin MD  Performed: anesthesiologist     Indications and Patient Condition

## 2018-12-16 NOTE — H&P
Catracho Starr Patient Status:  Inpatient    6/15/1980 MRN [id number]   Location [facility] 4W/SW/SE Attending Helen Stout., MD   Hosp Day # 0 PCP Sanya Hernandez MD     Date:  2018 3\" (1.6 m), weight 140 lb (63.5 kg), last menstrual period 12/14/2018, SpO2 98 %, not currently breastfeeding. General: No acute distress. Alert and oriented x 3. Drowsy post anesthesia  HEENT: Moist mucous membranes. EOM-I.  PERRL  Neck: No lymphadenopat in face-to-face discussion of further evaluation and therapy. Franklyn Torres.  Kevin Navarro MD  12/16/2018

## 2018-12-17 PROCEDURE — 99232 SBSQ HOSP IP/OBS MODERATE 35: CPT | Performed by: HOSPITALIST

## 2018-12-17 RX ORDER — HYDROCODONE BITARTRATE AND ACETAMINOPHEN 5; 325 MG/1; MG/1
1-2 TABLET ORAL EVERY 6 HOURS PRN
Qty: 20 TABLET | Refills: 0 | Status: SHIPPED | OUTPATIENT
Start: 2018-12-17 | End: 2018-12-29

## 2018-12-17 RX ORDER — POTASSIUM CHLORIDE 14.9 MG/ML
20 INJECTION INTRAVENOUS ONCE
Status: COMPLETED | OUTPATIENT
Start: 2018-12-17 | End: 2018-12-17

## 2018-12-17 RX ORDER — SODIUM CHLORIDE 9 MG/ML
INJECTION, SOLUTION INTRAVENOUS
Status: COMPLETED
Start: 2018-12-17 | End: 2018-12-17

## 2018-12-17 RX ORDER — METOCLOPRAMIDE HYDROCHLORIDE 5 MG/ML
10 INJECTION INTRAMUSCULAR; INTRAVENOUS EVERY 6 HOURS PRN
Status: DISCONTINUED | OUTPATIENT
Start: 2018-12-17 | End: 2018-12-18

## 2018-12-17 RX ORDER — POTASSIUM CHLORIDE 20 MEQ/1
40 TABLET, EXTENDED RELEASE ORAL EVERY 4 HOURS
Status: DISCONTINUED | OUTPATIENT
Start: 2018-12-17 | End: 2018-12-17

## 2018-12-17 RX ORDER — HEPARIN SODIUM 5000 [USP'U]/ML
5000 INJECTION, SOLUTION INTRAVENOUS; SUBCUTANEOUS EVERY 12 HOURS
Status: DISCONTINUED | OUTPATIENT
Start: 2018-12-17 | End: 2018-12-18

## 2018-12-17 NOTE — PROGRESS NOTES
[facility]    Progress Note    Jonathan Armenta Patient Status:  Inpatient    6/15/1980 MRN [id number]   Location [facility] 4W/SW/SE Attending Israel Currie MD   Hosp Day # 1 PCP Kirk Eddy MD       Subjective:   Krissy Kimball injection 4 mg, 4 mg, Intravenous, Q2H PRN  •  0.9%  NaCl infusion, , Intravenous, Continuous  •  ondansetron HCl (ZOFRAN) injection 4 mg, 4 mg, Intravenous, Once  •  docusate sodium (COLACE) cap 100 mg, 100 mg, Oral, BID  •  PEG 3350 (MIRALAX) powder pack Oral (er)    Result Date: 12/16/2018  CONCLUSION:  1. Early acute appendicitis. Dictated by (CST): Yoan Dill MD on 12/16/2018 at 8:16     Approved by (CST): Yoan Dill MD on 12/16/2018 at 8:23                  Chris Wadsworth

## 2018-12-17 NOTE — PROGRESS NOTES
[facility]    Progress Note    Gaylyn Meckel Patient Status:  Inpatient    6/15/1980 MRN [id number]   Location [facility] 4W/SW/SE Attending Jacqui Reyes MD   Hosp Day # 1 PCP Kelin Valiente MD     Subjective:     POD #1 Hawk    D/w Dr. Keon Craig. Nausea/pain control. Will add HSQ for DVT prophy and PPI for GI prophy.     Meds:     • Potassium Chloride ER  40 mEq Oral Q4H   • ondansetron HCl  4 mg Intravenous Once   • docusate sodium  100 mg Oral BID   • piperacillin-tazobacta

## 2018-12-18 VITALS
WEIGHT: 140 LBS | HEIGHT: 63 IN | RESPIRATION RATE: 18 BRPM | TEMPERATURE: 99 F | DIASTOLIC BLOOD PRESSURE: 72 MMHG | BODY MASS INDEX: 24.8 KG/M2 | SYSTOLIC BLOOD PRESSURE: 122 MMHG | OXYGEN SATURATION: 98 % | HEART RATE: 69 BPM

## 2018-12-18 PROCEDURE — 99239 HOSP IP/OBS DSCHRG MGMT >30: CPT | Performed by: HOSPITALIST

## 2018-12-18 RX ORDER — ONDANSETRON HYDROCHLORIDE 8 MG/1
8 TABLET, FILM COATED ORAL EVERY 8 HOURS PRN
Qty: 30 TABLET | Refills: 0 | Status: SHIPPED | OUTPATIENT
Start: 2018-12-18 | End: 2018-12-29

## 2018-12-18 NOTE — PLAN OF CARE
DISCHARGE PLANNING    • Discharge to home or other facility with appropriate resources Completed        GASTROINTESTINAL - ADULT    • Minimal or absence of nausea and vomiting Completed        PAIN - ADULT    • Verbalizes/displays adequate comfort level or

## 2018-12-18 NOTE — DISCHARGE SUMMARY
[facility]    Discharge Summary    Alek Leon Patient Status:  Inpatient    6/15/1980 MRN [id number]   Location [facility] 4W/SW/SE Attending Steven Reeves MD   Hosp Day # 2 PCP Shelly Angel MD     Date of Admission to RLQ. Pt also reported loose stools prior to worsening abdominal pain. She denied any f,c,ha, blurry vision, sob or cp. She has never felt these sx before. Upon arrival to ER CT abd and pelvis showed early acute appendicitis.  General surgery was consulte In 1 week    [address] [facility]       I answered all the patient's questions spending >30 minutes with patient in well over half time in face-to-face discussion of further evaluation and therapy. Amado Mendez.  Storm Landau  12/18/2018

## 2018-12-19 NOTE — PROGRESS NOTES
[facility]    Progress Note    Gurpreet Jeremiasas Patient Status:  Inpatient    6/15/1980 MRN [id number]   Location [facility] 4W/SW/SE Attending Murtaza Perez MD   Hosp Day # 2 PCP Noel Garcia MD     Subjective:     POD #2 mg Intravenous Once   • docusate sodium  100 mg Oral BID       Results:       Recent Labs   Lab  12/16/18   0704  12/17/18   0435  12/18/18   0506   RBC  4.68  3.77  3.62*   HGB  13.0  10.5*  10.4*   HCT  39.3  31.8*  30.8*   MCV  84.1  84.2  85.0   MCH  2

## 2018-12-29 ENCOUNTER — OFFICE VISIT (OUTPATIENT)
Dept: INTERNAL MEDICINE CLINIC | Facility: CLINIC | Age: 38
End: 2018-12-29

## 2018-12-29 VITALS
HEIGHT: 61.5 IN | WEIGHT: 155 LBS | BODY MASS INDEX: 28.89 KG/M2 | HEART RATE: 71 BPM | TEMPERATURE: 98 F | SYSTOLIC BLOOD PRESSURE: 105 MMHG | DIASTOLIC BLOOD PRESSURE: 65 MMHG

## 2018-12-29 DIAGNOSIS — K35.80 ACUTE APPENDICITIS, UNSPECIFIED ACUTE APPENDICITIS TYPE: Primary | ICD-10-CM

## 2018-12-29 PROBLEM — K35.30 ACUTE APPENDICITIS WITH LOCALIZED PERITONITIS, WITHOUT PERFORATION, ABSCESS, OR GANGRENE: Status: RESOLVED | Noted: 2018-12-16 | Resolved: 2018-12-29

## 2018-12-29 PROCEDURE — 99213 OFFICE O/P EST LOW 20 MIN: CPT | Performed by: INTERNAL MEDICINE

## 2018-12-29 NOTE — PATIENT INSTRUCTIONS
When you see the surgeon, he will clear you to go back to work. If you want any paperwork need to be filled please give it back to me I will be happy to fill it  for you. If everything is good, please see Dr. Edouard Alvarado in 2-3 months.

## 2018-12-29 NOTE — PROGRESS NOTES
Heidi Ferreira is a 45year old female. Patient presents with:  Post-Op: Appendectomy f/u     HPI:   17-year-old female here for follow-up after hospitalization.   Patient had abdominal pain on December 19 came to the emergency room found to have appendicit °F (36.5 °C) (Oral)   Ht 5' 1.5\" (1.562 m)   Wt 155 lb (70.3 kg)   LMP 12/14/2018 (Approximate)   BMI 28.81 kg/m²      Physical Exam    Constitutional: She is oriented to person, place, and time. She appears well-nourished. HENT:   Head: Normocephalic.

## 2019-03-16 ENCOUNTER — HOSPITAL ENCOUNTER (OUTPATIENT)
Dept: GENERAL RADIOLOGY | Facility: HOSPITAL | Age: 39
Discharge: HOME | End: 2019-03-16
Attending: INTERNAL MEDICINE
Payer: COMMERCIAL

## 2019-03-16 ENCOUNTER — OFFICE VISIT (OUTPATIENT)
Dept: INTERNAL MEDICINE CLINIC | Facility: CLINIC | Age: 39
End: 2019-03-16

## 2019-03-16 VITALS
SYSTOLIC BLOOD PRESSURE: 121 MMHG | TEMPERATURE: 98 F | WEIGHT: 154 LBS | HEART RATE: 71 BPM | DIASTOLIC BLOOD PRESSURE: 63 MMHG | BODY MASS INDEX: 28.7 KG/M2 | HEIGHT: 61.5 IN

## 2019-03-16 DIAGNOSIS — M25.561 RIGHT KNEE PAIN, UNSPECIFIED CHRONICITY: Primary | ICD-10-CM

## 2019-03-16 DIAGNOSIS — T78.40XA ALLERGIC STATE, INITIAL ENCOUNTER: ICD-10-CM

## 2019-03-16 DIAGNOSIS — M25.561 RIGHT KNEE PAIN, UNSPECIFIED CHRONICITY: ICD-10-CM

## 2019-03-16 DIAGNOSIS — T78.40XD ALLERGIC STATE, SUBSEQUENT ENCOUNTER: ICD-10-CM

## 2019-03-16 PROCEDURE — 99213 OFFICE O/P EST LOW 20 MIN: CPT | Performed by: INTERNAL MEDICINE

## 2019-03-16 PROCEDURE — 73562 X-RAY EXAM OF KNEE 3: CPT | Performed by: INTERNAL MEDICINE

## 2019-03-16 NOTE — PATIENT INSTRUCTIONS
Right knee pain, unspecified chronicity  (primary encounter diagnosis) etiology ?  Take ibuprofen as needed , ice can help if not better follow up     Seasonal allergies- she is refusing to take medication will refer her to allergy specialist

## 2019-03-16 NOTE — PROGRESS NOTES
HPI:    Patient ID: Bar Powell is a 45year old female. HPI  She is here for follow up   She had appendectomy end of Decemeber -  Was seen by surgery in January and was told everything is ok.  She wants to make sure that sh can exercise because she light-headedness, numbness and headaches. Hematological: Negative for adenopathy. Does not bruise/bleed easily. Psychiatric/Behavioral: Negative for agitation, behavioral problems, confusion, hallucinations and sleep disturbance.  The patient is not ner exhibits no discharge. Left eye exhibits no discharge. No scleral icterus. Neck: Normal range of motion. Neck supple. No JVD present. No tracheal tenderness present. No tracheal deviation present. No thyroid mass and no thyromegaly present.    Cardiovascu allergy specialist     No orders of the defined types were placed in this encounter.       Meds This Visit:  Requested Prescriptions      No prescriptions requested or ordered in this encounter       Imaging & Referrals:  ALLERGY - INTERNAL  XR KNEE ROUTINE

## 2019-03-27 ENCOUNTER — OFFICE VISIT (OUTPATIENT)
Dept: ALLERGY | Facility: CLINIC | Age: 39
End: 2019-03-27

## 2019-03-27 ENCOUNTER — NURSE ONLY (OUTPATIENT)
Dept: ALLERGY | Facility: CLINIC | Age: 39
End: 2019-03-27

## 2019-03-27 VITALS
HEART RATE: 100 BPM | OXYGEN SATURATION: 97 % | SYSTOLIC BLOOD PRESSURE: 118 MMHG | BODY MASS INDEX: 28.86 KG/M2 | RESPIRATION RATE: 16 BRPM | WEIGHT: 156.81 LBS | DIASTOLIC BLOOD PRESSURE: 70 MMHG | HEIGHT: 62 IN | TEMPERATURE: 98 F

## 2019-03-27 DIAGNOSIS — J30.89 PERENNIAL ALLERGIC RHINITIS WITH SEASONAL VARIATION: Primary | ICD-10-CM

## 2019-03-27 DIAGNOSIS — T78.1XXA ADVERSE FOOD REACTION, INITIAL ENCOUNTER: ICD-10-CM

## 2019-03-27 DIAGNOSIS — Z91.018 FOOD ALLERGY: ICD-10-CM

## 2019-03-27 DIAGNOSIS — J30.89 ENVIRONMENTAL AND SEASONAL ALLERGIES: ICD-10-CM

## 2019-03-27 DIAGNOSIS — T78.1XXA POLLEN-FOOD ALLERGY, INITIAL ENCOUNTER: ICD-10-CM

## 2019-03-27 DIAGNOSIS — J30.2 PERENNIAL ALLERGIC RHINITIS WITH SEASONAL VARIATION: Primary | ICD-10-CM

## 2019-03-27 PROCEDURE — 95004 PERQ TESTS W/ALRGNC XTRCS: CPT | Performed by: ALLERGY & IMMUNOLOGY

## 2019-03-27 PROCEDURE — 99244 OFF/OP CNSLTJ NEW/EST MOD 40: CPT | Performed by: ALLERGY & IMMUNOLOGY

## 2019-03-27 PROCEDURE — 95024 IQ TESTS W/ALLERGENIC XTRCS: CPT | Performed by: ALLERGY & IMMUNOLOGY

## 2019-03-27 PROCEDURE — 99212 OFFICE O/P EST SF 10 MIN: CPT | Performed by: ALLERGY & IMMUNOLOGY

## 2019-03-27 RX ORDER — FLUTICASONE PROPIONATE 50 MCG
2 SPRAY, SUSPENSION (ML) NASAL DAILY
Qty: 1 BOTTLE | Refills: 0 | Status: SHIPPED | OUTPATIENT
Start: 2019-03-27 | End: 2019-04-17

## 2019-03-27 RX ORDER — LEVOCETIRIZINE DIHYDROCHLORIDE 5 MG/1
5 TABLET, FILM COATED ORAL NIGHTLY
Qty: 30 TABLET | Refills: 0 | Status: SHIPPED | OUTPATIENT
Start: 2019-03-27 | End: 2019-04-17

## 2019-03-27 NOTE — PATIENT INSTRUCTIONS
1. AR  Handouts on allergies and avoidance measures provided and reviewed including potential treatment option of immunotherapy  Trial of Flonase 2 sprays per nostril once a day.   Reviewed to use a nasal spray on a daily basis and may take a full week to t

## 2019-03-27 NOTE — PROGRESS NOTES
Prudence Shall is a 45year old female. HPI:   Patient presents with: Allergies: Itchy/watery eyes, sinus congestion, sneezing.     Patient was 18 minutes late for her appointment  Reviewed with patient that due to her tardiness this would be an abbrev Rfl: 0   Fluticasone Propionate (FLONASE) 50 MCG/ACT Nasal Suspension 2 sprays by Nasal route daily.  Disp: 1 Bottle Rfl: 0       Allergies:    Penicillins             HIVES, NAUSEA ONLY      ROS:     Allergic/Immuno:  See HPI  Cardiovascular:  Negative for (primary encounter diagnosis)  Pollen-food allergy, initial encounter  Adverse food reaction, initial encounter    Patient is a 28-year-old female with multiyear history that is worsened over the past year year-round basis with seasonal worsening in the sp symptoms. 3.  Adverse food reaction  Patient reports increased mucus production with milk. See above skin testing to screen for an IgE mediated process to milk. Handouts on food allergies versus food intolerances provided and reviewed.   May continue t

## 2019-04-17 ENCOUNTER — OFFICE VISIT (OUTPATIENT)
Dept: ALLERGY | Facility: CLINIC | Age: 39
End: 2019-04-17

## 2019-04-17 ENCOUNTER — TELEPHONE (OUTPATIENT)
Dept: ALLERGY | Facility: CLINIC | Age: 39
End: 2019-04-17

## 2019-04-17 VITALS
SYSTOLIC BLOOD PRESSURE: 106 MMHG | OXYGEN SATURATION: 98 % | RESPIRATION RATE: 16 BRPM | TEMPERATURE: 98 F | BODY MASS INDEX: 28.34 KG/M2 | WEIGHT: 154 LBS | HEART RATE: 73 BPM | DIASTOLIC BLOOD PRESSURE: 70 MMHG | HEIGHT: 62 IN

## 2019-04-17 DIAGNOSIS — T78.1XXA POLLEN-FOOD ALLERGY, INITIAL ENCOUNTER: Primary | ICD-10-CM

## 2019-04-17 DIAGNOSIS — J30.89 PERENNIAL ALLERGIC RHINITIS WITH SEASONAL VARIATION: ICD-10-CM

## 2019-04-17 DIAGNOSIS — J30.2 PERENNIAL ALLERGIC RHINITIS WITH SEASONAL VARIATION: ICD-10-CM

## 2019-04-17 DIAGNOSIS — J30.89 ENVIRONMENTAL AND SEASONAL ALLERGIES: ICD-10-CM

## 2019-04-17 PROCEDURE — 99212 OFFICE O/P EST SF 10 MIN: CPT | Performed by: ALLERGY & IMMUNOLOGY

## 2019-04-17 PROCEDURE — 99214 OFFICE O/P EST MOD 30 MIN: CPT | Performed by: ALLERGY & IMMUNOLOGY

## 2019-04-17 RX ORDER — FLUTICASONE PROPIONATE 50 MCG
1 SPRAY, SUSPENSION (ML) NASAL DAILY
Qty: 1 BOTTLE | Refills: 5 | Status: SHIPPED | OUTPATIENT
Start: 2019-04-17 | End: 2020-08-06

## 2019-04-17 RX ORDER — LEVOCETIRIZINE DIHYDROCHLORIDE 5 MG/1
5 TABLET, FILM COATED ORAL NIGHTLY
Qty: 30 TABLET | Refills: 5 | Status: SHIPPED | OUTPATIENT
Start: 2019-04-17 | End: 2020-08-15

## 2019-04-17 NOTE — TELEPHONE ENCOUNTER
Immunotherapy order received. Chart completed. Filed in AIT cabinet. No further action needed at this time. Left message for patient to notify them of AIT chart being created and that she can come for shots at designated times.  Provided call ba

## 2019-04-17 NOTE — PROGRESS NOTES
Gurpreet Yu is a 45year old female. HPI:   Patient presents with: Allergies: 3-Week F/u. She reports some improvement. She is sleeping better. During the day, she is still having some congestion.  Considering allergy shots, would like to discuss 30 tablet Rfl: 5   Fluticasone Propionate (FLONASE) 50 MCG/ACT Nasal Suspension 1 spray by Nasal route daily.  Disp: 1 Bottle Rfl: 5       Allergies:    Penicillins             HIVES, NAUSEA ONLY      ROS:   Allergic/Immuno:  See hpi  Cardiovascular:  Negat avoidance measures  Follow-up in 6 months or sooner if needed         Orders This Visit:  No orders of the defined types were placed in this encounter.       Meds This Visit:  Requested Prescriptions     Signed Prescriptions Disp Refills   • Levocetirizine

## 2019-04-22 ENCOUNTER — OFFICE VISIT (OUTPATIENT)
Dept: INTERNAL MEDICINE CLINIC | Facility: CLINIC | Age: 39
End: 2019-04-22

## 2019-04-22 VITALS
HEIGHT: 62 IN | HEART RATE: 80 BPM | WEIGHT: 154 LBS | DIASTOLIC BLOOD PRESSURE: 85 MMHG | TEMPERATURE: 98 F | BODY MASS INDEX: 28.34 KG/M2 | SYSTOLIC BLOOD PRESSURE: 112 MMHG | RESPIRATION RATE: 18 BRPM

## 2019-04-22 DIAGNOSIS — Z00.00 ANNUAL PHYSICAL EXAM: ICD-10-CM

## 2019-04-22 DIAGNOSIS — D50.8 OTHER IRON DEFICIENCY ANEMIA: ICD-10-CM

## 2019-04-22 DIAGNOSIS — Z12.4 SCREENING FOR CERVICAL CANCER: Primary | ICD-10-CM

## 2019-04-22 PROCEDURE — 99395 PREV VISIT EST AGE 18-39: CPT | Performed by: INTERNAL MEDICINE

## 2019-04-22 NOTE — PROGRESS NOTES
HPI:   Nicholas Carter is a 45year old female who presents for a complete physical exam   Her menstrual period  Is regular , normal flow  6 days    . Wt Readings from Last 3 Encounters:  04/22/19 : 154 lb (69.9 kg)  04/17/19 : 154 lb (69.9 kg)  03/27/19 : vomiting.  Negative Dysuria, hematuria, urinary incontinence. Menses regular, not heavy. Endocrine Negative Cold intolerance and heat intolerance.    Neuro Negative Dizziness, extremity weakness, headache, memory impairment, numbness in extremities, s Motor - Normal. Balance & gait - Normal.   Psychiatric Normal Orientation - Oriented to time, place, person & situation. Appropriate mood and affect.           ASSESSMENT AND PLAN:   Iza Sue is a 45year old female who presents for a complete physic

## 2019-04-30 ENCOUNTER — APPOINTMENT (OUTPATIENT)
Dept: LAB | Age: 39
End: 2019-04-30
Attending: INTERNAL MEDICINE
Payer: COMMERCIAL

## 2019-04-30 DIAGNOSIS — E78.00 HYPERCHOLESTEROLEMIA: ICD-10-CM

## 2019-04-30 DIAGNOSIS — E03.8 SUBCLINICAL HYPOTHYROIDISM: ICD-10-CM

## 2019-04-30 PROCEDURE — 80061 LIPID PANEL: CPT

## 2019-04-30 PROCEDURE — 84443 ASSAY THYROID STIM HORMONE: CPT

## 2019-04-30 PROCEDURE — 86376 MICROSOMAL ANTIBODY EACH: CPT

## 2019-04-30 PROCEDURE — 36415 COLL VENOUS BLD VENIPUNCTURE: CPT

## 2019-05-01 ENCOUNTER — OFFICE VISIT (OUTPATIENT)
Dept: OBGYN CLINIC | Facility: CLINIC | Age: 39
End: 2019-05-01

## 2019-05-01 VITALS
HEART RATE: 74 BPM | SYSTOLIC BLOOD PRESSURE: 118 MMHG | BODY MASS INDEX: 28 KG/M2 | WEIGHT: 153 LBS | DIASTOLIC BLOOD PRESSURE: 81 MMHG

## 2019-05-01 DIAGNOSIS — R87.810 CERVICAL HIGH RISK HUMAN PAPILLOMAVIRUS (HPV) DNA TEST POSITIVE: Primary | ICD-10-CM

## 2019-05-01 PROCEDURE — 99202 OFFICE O/P NEW SF 15 MIN: CPT | Performed by: OBSTETRICS & GYNECOLOGY

## 2019-05-01 RX ORDER — MELOXICAM 15 MG/1
TABLET ORAL
Refills: 0 | COMMUNITY
Start: 2019-04-21 | End: 2020-08-15

## 2019-05-01 RX ORDER — FLUCONAZOLE 150 MG/1
TABLET ORAL
Refills: 0 | COMMUNITY
Start: 2019-04-28 | End: 2020-08-15

## 2019-05-02 NOTE — PROGRESS NOTES
[facility], [facility]  Obstetrics and Gynecology  Focused Gynecology Problem Exam  Paramjit Donahue MD    Jovanny Partida is a 45year old female presenting for Consult (Abnormal Pap, New pt.)  . HPI:   Patient presents with:  Consult: Abnormal Pap, New pt. name: Not on file      Number of children: 0      Years of education: Not on file      Highest education level: Not on file    Occupational History      Not on file    Social Needs      Financial resource strain: Not on file      Food insecurity:         Wo distress    ASSESSMENT:    A: 45 y.o.  with NL pap and high risk HPV positive/HPV 16&18 negative    (R87.810) Cervical high risk human papillomavirus (HPV) DNA test positive  (primary encounter diagnosis)      PLAN:   Pt counseled on pap result and

## 2020-08-06 ENCOUNTER — OFFICE VISIT (OUTPATIENT)
Dept: FAMILY MEDICINE CLINIC | Facility: CLINIC | Age: 40
End: 2020-08-06

## 2020-08-06 VITALS
HEIGHT: 62 IN | TEMPERATURE: 98 F | SYSTOLIC BLOOD PRESSURE: 104 MMHG | DIASTOLIC BLOOD PRESSURE: 67 MMHG | WEIGHT: 159 LBS | HEART RATE: 64 BPM | BODY MASS INDEX: 29.26 KG/M2

## 2020-08-06 DIAGNOSIS — H65.02 NON-RECURRENT ACUTE SEROUS OTITIS MEDIA OF LEFT EAR: Primary | ICD-10-CM

## 2020-08-06 PROBLEM — J30.2 SEASONAL ALLERGIES: Status: ACTIVE | Noted: 2020-08-06

## 2020-08-06 PROCEDURE — 3008F BODY MASS INDEX DOCD: CPT | Performed by: STUDENT IN AN ORGANIZED HEALTH CARE EDUCATION/TRAINING PROGRAM

## 2020-08-06 PROCEDURE — 3078F DIAST BP <80 MM HG: CPT | Performed by: STUDENT IN AN ORGANIZED HEALTH CARE EDUCATION/TRAINING PROGRAM

## 2020-08-06 PROCEDURE — 3074F SYST BP LT 130 MM HG: CPT | Performed by: STUDENT IN AN ORGANIZED HEALTH CARE EDUCATION/TRAINING PROGRAM

## 2020-08-06 PROCEDURE — 99202 OFFICE O/P NEW SF 15 MIN: CPT | Performed by: STUDENT IN AN ORGANIZED HEALTH CARE EDUCATION/TRAINING PROGRAM

## 2020-08-06 RX ORDER — FLUTICASONE PROPIONATE 50 MCG
1 SPRAY, SUSPENSION (ML) NASAL DAILY
Qty: 1 INHALER | Refills: 0 | Status: SHIPPED | OUTPATIENT
Start: 2020-08-06 | End: 2020-08-29

## 2020-08-06 NOTE — PROGRESS NOTES
HPI:    Patient ID: Alek Leon is a 36year old female. HPI  Pt presenting with Left ear pain. Started 3 days ago, associated with decreased hearing and nasal congestion.  H/o allergies, previously on Flonase and Xyzal which was discontinued 3 month Conjunctiva/sclera: Conjunctivae normal.   Neck:      Musculoskeletal: Normal range of motion and neck supple. Cardiovascular:      Rate and Rhythm: Normal rate and regular rhythm.       Heart sounds: Normal heart sounds, S1 normal and S2 normal. No mur

## 2020-08-15 ENCOUNTER — OFFICE VISIT (OUTPATIENT)
Dept: INTERNAL MEDICINE CLINIC | Facility: CLINIC | Age: 40
End: 2020-08-15

## 2020-08-15 VITALS
OXYGEN SATURATION: 97 % | HEIGHT: 62 IN | WEIGHT: 159.63 LBS | SYSTOLIC BLOOD PRESSURE: 112 MMHG | TEMPERATURE: 98 F | BODY MASS INDEX: 29.38 KG/M2 | DIASTOLIC BLOOD PRESSURE: 66 MMHG | HEART RATE: 72 BPM

## 2020-08-15 DIAGNOSIS — Z12.31 ENCOUNTER FOR SCREENING MAMMOGRAM FOR BREAST CANCER: ICD-10-CM

## 2020-08-15 DIAGNOSIS — H91.8X2 OTHER HEARING LOSS OF LEFT EAR WITH UNRESTRICTED HEARING OF RIGHT EAR: Primary | ICD-10-CM

## 2020-08-15 PROCEDURE — 3078F DIAST BP <80 MM HG: CPT | Performed by: INTERNAL MEDICINE

## 2020-08-15 PROCEDURE — 3008F BODY MASS INDEX DOCD: CPT | Performed by: INTERNAL MEDICINE

## 2020-08-15 PROCEDURE — 99213 OFFICE O/P EST LOW 20 MIN: CPT | Performed by: INTERNAL MEDICINE

## 2020-08-15 PROCEDURE — 3074F SYST BP LT 130 MM HG: CPT | Performed by: INTERNAL MEDICINE

## 2020-08-15 RX ORDER — PREDNISONE 10 MG/1
TABLET ORAL
Qty: 6 TABLET | Refills: 0 | Status: SHIPPED | OUTPATIENT
Start: 2020-08-15 | End: 2020-08-22 | Stop reason: ALTCHOICE

## 2020-08-15 NOTE — PROGRESS NOTES
HPI:    Patient ID: Landon Patel is a 36year old female. HPI she is here today complaining of any change on her left ear.   According to her this started 3 weeks ago got better and couple of days ago started again she feels like her left ear is clogg Psychiatric/Behavioral: Negative for agitation, behavioral problems, confusion, hallucinations and sleep disturbance. The patient is not nervous/anxious.           Current Outpatient Medications   Medication Sig Dispense Refill   • predniSONE 10 MG Oral T normal. Right sinus exhibits no maxillary sinus tenderness and no frontal sinus tenderness. Left sinus exhibits no maxillary sinus tenderness and no frontal sinus tenderness.    Mouth/Throat: Uvula is midline, oropharynx is clear and moist and mucous [place]    Other hearing loss of left ear-exam is normal I will put her on prednisone for few days referral for ENT, patient to continue with her decongestant and allergy medication    No orders of the defined types were placed in this encounter.       Me

## 2020-08-22 ENCOUNTER — OFFICE VISIT (OUTPATIENT)
Dept: AUDIOLOGY | Facility: CLINIC | Age: 40
End: 2020-08-22

## 2020-08-22 ENCOUNTER — OFFICE VISIT (OUTPATIENT)
Dept: OTOLARYNGOLOGY | Facility: CLINIC | Age: 40
End: 2020-08-22

## 2020-08-22 VITALS
BODY MASS INDEX: 29.26 KG/M2 | DIASTOLIC BLOOD PRESSURE: 77 MMHG | SYSTOLIC BLOOD PRESSURE: 116 MMHG | HEIGHT: 62 IN | HEART RATE: 64 BPM | RESPIRATION RATE: 16 BRPM | WEIGHT: 159 LBS | TEMPERATURE: 97 F

## 2020-08-22 DIAGNOSIS — H93.12 TINNITUS, LEFT: Primary | ICD-10-CM

## 2020-08-22 DIAGNOSIS — H81.02 MENIERE'S DISEASE OF LEFT EAR: ICD-10-CM

## 2020-08-22 DIAGNOSIS — H91.93 BILATERAL HEARING LOSS, UNSPECIFIED HEARING LOSS TYPE: Primary | ICD-10-CM

## 2020-08-22 PROCEDURE — 3074F SYST BP LT 130 MM HG: CPT | Performed by: OTOLARYNGOLOGY

## 2020-08-22 PROCEDURE — 3008F BODY MASS INDEX DOCD: CPT | Performed by: OTOLARYNGOLOGY

## 2020-08-22 PROCEDURE — 99243 OFF/OP CNSLTJ NEW/EST LOW 30: CPT | Performed by: OTOLARYNGOLOGY

## 2020-08-22 PROCEDURE — 3078F DIAST BP <80 MM HG: CPT | Performed by: OTOLARYNGOLOGY

## 2020-08-22 PROCEDURE — 92567 TYMPANOMETRY: CPT | Performed by: AUDIOLOGIST

## 2020-08-22 PROCEDURE — 92557 COMPREHENSIVE HEARING TEST: CPT | Performed by: AUDIOLOGIST

## 2020-08-22 RX ORDER — MONTELUKAST SODIUM 10 MG/1
10 TABLET ORAL NIGHTLY
Qty: 30 TABLET | Refills: 3 | Status: SHIPPED | OUTPATIENT
Start: 2020-08-22

## 2020-08-22 RX ORDER — LORATADINE 10 MG/1
10 TABLET ORAL DAILY
Qty: 30 TABLET | Refills: 3 | Status: SHIPPED | OUTPATIENT
Start: 2020-08-22 | End: 2020-08-29

## 2020-08-22 RX ORDER — TRIAMTERENE AND HYDROCHLOROTHIAZIDE 37.5; 25 MG/1; MG/1
1 CAPSULE ORAL EVERY MORNING
Qty: 30 CAPSULE | Refills: 2 | Status: SHIPPED | OUTPATIENT
Start: 2020-08-22

## 2020-08-22 RX ORDER — AZELASTINE 1 MG/ML
2 SPRAY, METERED NASAL 2 TIMES DAILY
Qty: 1 BOTTLE | Refills: 0 | Status: SHIPPED | OUTPATIENT
Start: 2020-08-22

## 2020-08-22 NOTE — PROGRESS NOTES
Mando Robbins is a 36year old female.   Patient presents with:  Hearing Check: Patient experience hearing loss,ringing of left ear x 6 month  Allergies  Nose Problem: Patient reports difficulty breathing      HISTORY OF PRESENT ILLNESS  She presents with Bike Helmet: No        Seat Belt: Yes        Self-Exams: Yes      Family History   Problem Relation Age of Onset   • Cancer Other         Maternal Uncle-Thyroid CA   • Arthritis Other         Maternal Sister   • Diabetes Mother        Past Medical History: External nose - Normal. Lips/teeth/gums - Normal. Tonsils - Normal. Oropharynx - Normal.   Ears Normal Inspection - Right: Normal, Left: Normal. Canal - Right: Normal, Left: Normal. TM - Right: Normal, Left: Normal.   Skin Normal Inspection - Normal. Cherylene Scotts, MD    8/22/2020    1:14 PM

## 2020-08-29 ENCOUNTER — OFFICE VISIT (OUTPATIENT)
Dept: INTERNAL MEDICINE CLINIC | Facility: CLINIC | Age: 40
End: 2020-08-29

## 2020-08-29 VITALS
HEIGHT: 61.5 IN | TEMPERATURE: 98 F | HEART RATE: 76 BPM | DIASTOLIC BLOOD PRESSURE: 72 MMHG | WEIGHT: 160 LBS | BODY MASS INDEX: 29.82 KG/M2 | OXYGEN SATURATION: 97 % | SYSTOLIC BLOOD PRESSURE: 106 MMHG

## 2020-08-29 DIAGNOSIS — N89.8 VAGINAL ITCHING: ICD-10-CM

## 2020-08-29 DIAGNOSIS — R39.15 URINARY URGENCY: Primary | ICD-10-CM

## 2020-08-29 DIAGNOSIS — M54.50 LOW BACK PAIN, UNSPECIFIED BACK PAIN LATERALITY, UNSPECIFIED CHRONICITY, UNSPECIFIED WHETHER SCIATICA PRESENT: ICD-10-CM

## 2020-08-29 LAB
BILIRUB UR QL: NEGATIVE
BILIRUBIN: NEGATIVE
COLOR UR: YELLOW
GLUCOSE (URINE DIPSTICK): NEGATIVE MG/DL
GLUCOSE UR-MCNC: NEGATIVE MG/DL
KETONES (URINE DIPSTICK): NEGATIVE MG/DL
KETONES UR-MCNC: NEGATIVE MG/DL
NITRITE UR QL STRIP.AUTO: NEGATIVE
NITRITE, URINE: NEGATIVE
PH UR: 5 [PH] (ref 5–8)
PH, URINE: 5.5 (ref 4.5–8)
PROT UR-MCNC: 30 MG/DL
PROTEIN (URINE DIPSTICK): 30 MG/DL
RBC #/AREA URNS AUTO: 10 /HPF
SP GR UR STRIP: 1.03 (ref 1–1.03)
SPECIFIC GRAVITY: >=1.03 (ref 1–1.03)
URINE-COLOR: YELLOW
UROBILINOGEN UR STRIP-ACNC: <2
UROBILINOGEN,SEMI-QN: 0.2 MG/DL (ref 0–1.9)
WBC #/AREA URNS AUTO: 23 /HPF

## 2020-08-29 PROCEDURE — 3008F BODY MASS INDEX DOCD: CPT | Performed by: NURSE PRACTITIONER

## 2020-08-29 PROCEDURE — 3074F SYST BP LT 130 MM HG: CPT | Performed by: NURSE PRACTITIONER

## 2020-08-29 PROCEDURE — 81003 URINALYSIS AUTO W/O SCOPE: CPT | Performed by: NURSE PRACTITIONER

## 2020-08-29 PROCEDURE — 99213 OFFICE O/P EST LOW 20 MIN: CPT | Performed by: NURSE PRACTITIONER

## 2020-08-29 PROCEDURE — 3078F DIAST BP <80 MM HG: CPT | Performed by: NURSE PRACTITIONER

## 2020-08-29 RX ORDER — SULFAMETHOXAZOLE AND TRIMETHOPRIM 800; 160 MG/1; MG/1
1 TABLET ORAL 2 TIMES DAILY
Qty: 10 TABLET | Refills: 0 | Status: SHIPPED | OUTPATIENT
Start: 2020-08-29 | End: 2020-09-03

## 2020-08-29 NOTE — PATIENT INSTRUCTIONS
ASSESSMENT/PLAN:   Urinary urgency  (primary encounter diagnosis)  Check urine  URINARY TRACT INFECTION  -encourage fluids 8-12 glasses of non-caffeinated fluids/day  -encourage drinking cranberry juice  -urinate after intercourse  -keep good hygiene, av

## 2020-08-29 NOTE — PROGRESS NOTES
HPI:    Patient ID: Landon Patel is a 36year old female. Has urinary urgency x 4 days with Vaginal itching, low back pain and suprapubic pain. Rated 4/10. Denies fever, burning, pain with urination, vaginal discharge, foul odor.   Patient only able t Pulmonary/Chest: Effort normal and breath sounds normal. No respiratory distress. She has no wheezes. She has no rales. Abdominal: Soft. Normal appearance and bowel sounds are normal. She exhibits no mass.  There is no hepatosplenomegaly, splenomegaly o mouth 2 (two) times daily for 5 days.        Imaging & Referrals:  None       KG#[id number]

## 2020-08-31 RX ORDER — FLUCONAZOLE 150 MG/1
150 TABLET ORAL ONCE
Qty: 1 TABLET | Refills: 0 | Status: SHIPPED | OUTPATIENT
Start: 2020-08-31 | End: 2020-08-31

## 2020-09-01 DIAGNOSIS — R82.90 URINE ABNORMALITY: Primary | ICD-10-CM

## 2020-09-19 ENCOUNTER — OFFICE VISIT (OUTPATIENT)
Dept: INTERNAL MEDICINE CLINIC | Facility: CLINIC | Age: 40
End: 2020-09-19

## 2020-09-19 VITALS
OXYGEN SATURATION: 98 % | BODY MASS INDEX: 30.42 KG/M2 | DIASTOLIC BLOOD PRESSURE: 64 MMHG | WEIGHT: 163.19 LBS | HEART RATE: 80 BPM | HEIGHT: 61.5 IN | TEMPERATURE: 98 F | SYSTOLIC BLOOD PRESSURE: 112 MMHG

## 2020-09-19 DIAGNOSIS — Z00.00 ANNUAL PHYSICAL EXAM: Primary | ICD-10-CM

## 2020-09-19 DIAGNOSIS — H93.12 TINNITUS OF LEFT EAR: ICD-10-CM

## 2020-09-19 PROBLEM — R39.15 URINARY URGENCY: Status: RESOLVED | Noted: 2020-08-29 | Resolved: 2020-09-19

## 2020-09-19 PROBLEM — N89.8 VAGINAL ITCHING: Status: RESOLVED | Noted: 2020-08-29 | Resolved: 2020-09-19

## 2020-09-19 LAB
ALBUMIN SERPL-MCNC: 3.6 G/DL (ref 3.4–5)
ALBUMIN/GLOB SERPL: 1 {RATIO} (ref 1–2)
ALP LIVER SERPL-CCNC: 90 U/L
ALT SERPL-CCNC: 38 U/L
ANION GAP SERPL CALC-SCNC: 8 MMOL/L (ref 0–18)
AST SERPL-CCNC: 19 U/L (ref 15–37)
BASOPHILS # BLD AUTO: 0.03 X10(3) UL (ref 0–0.2)
BASOPHILS NFR BLD AUTO: 0.5 %
BILIRUB SERPL-MCNC: 0.2 MG/DL (ref 0.1–2)
BUN BLD-MCNC: 9 MG/DL (ref 7–18)
BUN/CREAT SERPL: 18 (ref 10–20)
CALCIUM BLD-MCNC: 8.7 MG/DL (ref 8.5–10.1)
CHLORIDE SERPL-SCNC: 110 MMOL/L (ref 98–112)
CHOLEST SMN-MCNC: 174 MG/DL (ref ?–200)
CO2 SERPL-SCNC: 22 MMOL/L (ref 21–32)
CREAT BLD-MCNC: 0.5 MG/DL
DEPRECATED RDW RBC AUTO: 43.3 FL (ref 35.1–46.3)
EOSINOPHIL # BLD AUTO: 0.11 X10(3) UL (ref 0–0.7)
EOSINOPHIL NFR BLD AUTO: 1.7 %
ERYTHROCYTE [DISTWIDTH] IN BLOOD BY AUTOMATED COUNT: 14.9 % (ref 11–15)
GLOBULIN PLAS-MCNC: 3.6 G/DL (ref 2.8–4.4)
GLUCOSE BLD-MCNC: 101 MG/DL (ref 70–99)
HCT VFR BLD AUTO: 36.2 %
HDLC SERPL-MCNC: 30 MG/DL (ref 40–59)
HGB BLD-MCNC: 11.3 G/DL
IMM GRANULOCYTES # BLD AUTO: 0.03 X10(3) UL (ref 0–1)
IMM GRANULOCYTES NFR BLD: 0.5 %
LDLC SERPL CALC-MCNC: 99 MG/DL (ref ?–100)
LYMPHOCYTES # BLD AUTO: 2.07 X10(3) UL (ref 1–4)
LYMPHOCYTES NFR BLD AUTO: 31.5 %
M PROTEIN MFR SERPL ELPH: 7.2 G/DL (ref 6.4–8.2)
MCH RBC QN AUTO: 25.2 PG (ref 26–34)
MCHC RBC AUTO-ENTMCNC: 31.2 G/DL (ref 31–37)
MCV RBC AUTO: 80.6 FL
MONOCYTES # BLD AUTO: 0.57 X10(3) UL (ref 0.1–1)
MONOCYTES NFR BLD AUTO: 8.7 %
NEUTROPHILS # BLD AUTO: 3.77 X10 (3) UL (ref 1.5–7.7)
NEUTROPHILS # BLD AUTO: 3.77 X10(3) UL (ref 1.5–7.7)
NEUTROPHILS NFR BLD AUTO: 57.1 %
NONHDLC SERPL-MCNC: 144 MG/DL (ref ?–130)
OSMOLALITY SERPL CALC.SUM OF ELEC: 289 MOSM/KG (ref 275–295)
PATIENT FASTING Y/N/NP: YES
PATIENT FASTING Y/N/NP: YES
PLATELET # BLD AUTO: 289 10(3)UL (ref 150–450)
POTASSIUM SERPL-SCNC: 3.7 MMOL/L (ref 3.5–5.1)
RBC # BLD AUTO: 4.49 X10(6)UL
SODIUM SERPL-SCNC: 140 MMOL/L (ref 136–145)
TRIGL SERPL-MCNC: 227 MG/DL (ref 30–149)
TSI SER-ACNC: 3.09 MIU/ML (ref 0.36–3.74)
VLDLC SERPL CALC-MCNC: 45 MG/DL (ref 0–30)
WBC # BLD AUTO: 6.6 X10(3) UL (ref 4–11)

## 2020-09-19 PROCEDURE — 90471 IMMUNIZATION ADMIN: CPT | Performed by: INTERNAL MEDICINE

## 2020-09-19 PROCEDURE — 99396 PREV VISIT EST AGE 40-64: CPT | Performed by: INTERNAL MEDICINE

## 2020-09-19 PROCEDURE — 3074F SYST BP LT 130 MM HG: CPT | Performed by: INTERNAL MEDICINE

## 2020-09-19 PROCEDURE — 90686 IIV4 VACC NO PRSV 0.5 ML IM: CPT | Performed by: INTERNAL MEDICINE

## 2020-09-19 PROCEDURE — 36415 COLL VENOUS BLD VENIPUNCTURE: CPT | Performed by: INTERNAL MEDICINE

## 2020-09-19 PROCEDURE — 3078F DIAST BP <80 MM HG: CPT | Performed by: INTERNAL MEDICINE

## 2020-09-19 PROCEDURE — 3008F BODY MASS INDEX DOCD: CPT | Performed by: INTERNAL MEDICINE

## 2020-09-19 NOTE — PROGRESS NOTES
+  HPI:   Anthony Franco is a 36year old female who presents for a complete physical exam.her menstrual period is regular      Wt Readings from Last 3 Encounters:  09/19/20 : 163 lb 3.2 oz (74 kg)  08/29/20 : 160 lb (72.6 kg)  08/22/20 : 159 lb (72.1 kg) Negative Eye pain and vision changes. Respiratory Negative Cough, dyspnea and wheezing. Cardio Negative Chest pain, claudication, edema and irregular heartbeat/palpitations.    GI Negative Abdominal pain, blood in stool, constipation, diarrhea, heartbur Inspection normal, BS active. No abdominal tenderness or masses palpable   Musculoskeletal Normal Overview - Normal. No swelling or deformities.    Skin Normal Inspection - Normal.   Neurological Normal Memory - Normal. Sensory - Normal. Motor - Normal. Ilda Homans

## 2021-04-23 ENCOUNTER — TELEPHONE (OUTPATIENT)
Dept: INTERNAL MEDICINE CLINIC | Facility: CLINIC | Age: 41
End: 2021-04-23

## 2021-04-23 NOTE — TELEPHONE ENCOUNTER
pt is due for yearly mammogram, message has been left for pt to remind her to schedule mammogram appt. order has been mailed to pt.

## 2021-12-26 ENCOUNTER — TELEPHONE (OUTPATIENT)
Dept: INTERNAL MEDICINE CLINIC | Facility: CLINIC | Age: 41
End: 2021-12-26

## 2021-12-26 DIAGNOSIS — R05.9 COUGH: Primary | ICD-10-CM

## 2021-12-26 DIAGNOSIS — J02.9 SORE THROAT: ICD-10-CM

## 2021-12-26 NOTE — PROGRESS NOTES
I  Spoke wit pt, she has sore throat, fatigue, body pain,  She is concerned and wants to be tested, I explained to pt to monitor symptoms , will place order.  Any chest pain or sob or any other worsening symptoms to let us know or go to ER

## 2022-01-10 NOTE — TELEPHONE ENCOUNTER
Message #          2021 02:25p   [[email or web address]]  To:  From:  KIKA Rueda MD:  Phone#:  ----------------------------------------------------------------------  PT Parish Riddle, [phone],  [birth year], PT HAS CONCERNS OF HAVING COVID Paged a

## 2022-01-11 NOTE — TELEPHONE ENCOUNTER
Spoke with patient who was concerned about having COVID with congestion symptoms fatigue,  order for COVID was placed

## 2024-10-03 NOTE — PROGRESS NOTES
AUDIOGRAM     Yareli Brownlee was referred for testing by No ref. provider found. 6/15/1980  [id number]      Otoscopic Inspection:  both ears: no cerumen    Audiometric Test Results: The patient was tested using air only audiometry.   The audiometric thr
yes

## (undated) DEVICE — LAP CHOLE: Brand: MEDLINE INDUSTRIES, INC.

## (undated) DEVICE — SOL  .9 1000ML BTL

## (undated) DEVICE — TROCARS: Brand: KII® BALLOON BLUNT TIP SYSTEM

## (undated) DEVICE — ETS45 RELOAD STANDARD 45MM: Brand: ENDOPATH

## (undated) DEVICE — [HIGH FLOW INSUFFLATOR,  DO NOT USE IF PACKAGE IS DAMAGED,  KEEP DRY,  KEEP AWAY FROM SUNLIGHT,  PROTECT FROM HEAT AND RADIOACTIVE SOURCES.]: Brand: PNEUMOSURE

## (undated) DEVICE — TROCAR: Brand: KII FIOS FIRST ENTRY

## (undated) DEVICE — ENDOPATH ETS45 2.5MM RELOADS (VASCULAR/THIN): Brand: ENDOPATH

## (undated) DEVICE — SUTURE VICRYL 0 UR-6

## (undated) DEVICE — DEVICE PORT SITE CLOSURE

## (undated) DEVICE — GAMMEX® PI HYBRID SIZE 7.5, STERILE POWDER-FREE SURGICAL GLOVE, POLYISOPRENE AND NEOPRENE BLEND: Brand: GAMMEX

## (undated) DEVICE — UNDYED BRAIDED (POLYGLACTIN 910), SYNTHETIC ABSORBABLE SUTURE: Brand: COATED VICRYL

## (undated) DEVICE — Device: Brand: JELCO

## (undated) DEVICE — SOL  .9 3000ML

## (undated) DEVICE — DISPOSABLE GRASPER: Brand: EPIX LAPAROSCOPIC GRASPER

## (undated) DEVICE — TROCAR: Brand: KII® SLEEVE

## (undated) DEVICE — 12 ML SYRINGE LUER-LOCK TIP: Brand: MONOJECT

## (undated) DEVICE — ENDOPATH ETS-FLEX45 ARTICULATING ENDOSCOPIC LINEAR CUTTER, NO RELOAD: Brand: ENDOPATH

## (undated) DEVICE — TISSUE RETRIEVAL SYSTEM: Brand: INZII RETRIEVAL SYSTEM

## (undated) NOTE — LETTER
[facility] [address], [address] [place] [address]       08/22/20        Patient: Iza Sue   YOB: 1980   Date of Visit: 8/22/2020       Dear  Dr. Elaine Valdez MD,      Thank you for referring Iza Sue to my practice.   [place]

## (undated) NOTE — MR AVS SNAPSHOT
[address] [facility] [address]  [phone]               Thank you for choosing us for your health care visit with Niranjan Smith MD.  We are glad to serve you and happy to provide you with this summary of your visi · Recovering from depression is a process. Don’t be discouraged if it takes some time to feel better. · Depression saps your energy and concentration. So you won’t be able to do all the things you used to do. Set small goals and do what you can.   Take car · Feeling depressed or withdrawn  · Loss of interest in things you once enjoyed  · Trouble concentrating, poor memory, trouble making decisions  · Thoughts of harming or killing oneself, or thoughts that life is not worth living  · Low self-esteem  The andres · Feel very drowsy or have trouble awakening  · Faint or lose consciousness  · Have new chest pain that becomes more severe, lasts longer, or spreads into your shoulder, arm, neck, jaw or back  When to seek medical advice  Call your healthcare provider rig KETONES (URINE DIPSTICK) Trace Negative mg/dL    SPECIFIC GRAVITY 1.025 1.005 - 1.030    OCCULT BLOOD Neg Negative    PH, URINE 7.0 4.5 - 8.0    PROTEIN (URINE DIPSTICK) Trace Negative/Trace mg/dL    UROBILINOGEN,SEMI-QN 1.0 0.0 - 1.9 mg/dL    NITRITE, UR

## (undated) NOTE — LETTER
[facility]  Administration of Anesthesia  1. Devin Hidalgo, or _________________________________ acting on her behalf, (Patient) (Dependent/Representative) request to receive anesthesia for my pending procedure/operation/treatment.   A infections, high spinal block, spinal bleeding, seizure, cardiac arrest and death. 7. AWARENESS: I understand that it is possible (but unlikely) to have explicit memory of events from the operating room while under general anesthesia.   8. ELECTROCONVULSIV unconscious pt /Relationship    My signature below affirms that prior to the time of the procedure, I have explained to the patient and/or his/her guardian, the risks and benefits of undergoing anesthesia, as well as any reasonable alternatives.     _______

## (undated) NOTE — ED AVS SNAPSHOT
[facility] Emergency Department    [address]     [place] [address]    Phone:  [phone]    Fax:  [phone]           Soy Vicenta   MRN: [id number]    Department:  [facility] Emergency Department   Date of Visit:  3/22 and Class Registration line at [phone] or find a doctor online by visiting [email or web address].    IF THERE IS ANY CHANGE OR WORSENING OF YOUR CONDITION, CALL YOUR PRIMARY CARE PHYSICIAN AT ONCE OR RETURN IMMEDIATELY TO [address] [place] [address].     If

## (undated) NOTE — ED AVS SNAPSHOT
[facility] Emergency Department    [address]     [place] [address]    Phone:  [phone]    Fax:  [phone]           Kailey Marquezshereen   MRN: [id number]    Department:  [facility] Emergency Department   Date of Visit:  3/22 If you have difficulty scheduling your follow-up appointment as directed, please call our  at [phone]. Si tiene problemas para programar kain lopez de seguimiento según lo indicado, llame al encargado de agustin al [phone].     It i continue to take your medications as instructed by your Primary Care doctor until you can check with your doctor. Please bring the medication list to your next doctor's appointment.     Any imaging studies and labs completed today can be reviewed in your [place] Medicaid plans. To get signed up and covered, please call [phone] and ask to get set up for an insurance coverage that is in-network with Jaysonmoran Ding. Christina     Sign up for Mippin, your secure online medical record.   Mippin wi

## (undated) NOTE — LETTER
[address] [place] [address] Rd, [place], [place]     AUTHORIZATION FOR SURGICAL OPERATION OR PROCEDURE    I hereby authorize Dr. Itzel Isaacs MD, my Physician(s) and whomever may be designated as the doctor's Assistant, to perform the followi 4. I consent to the photographing of procedure(s) to be performed for the purposes of advancing medicine, science and/or education, provided my identity is not revealed.  If the procedure has been videotaped, the physician/surgeon will obtain the original v (Witness signature)                                                                                                  (Date)                                (Time)  STATEMENT OF PHYSICIAN My signature below affirms that prior to the time of the procedure;  I

## (undated) NOTE — Clinical Note
Patient Name: Aldair Gaines  : 6/15/1980  MRN: [id number]  Patient Address:  [facility] [address]        [facility] [address]  (COVID-19)     [facility] tiene un compromiso con la seguridad y Rosellen Carton de nu lugares públicos. Si usted tiene que salir, evite usar cualquier tipo de transporte público, desplazamiento compartido o taxis. 2. Vigile michelle síntomas con cuidado. Si michelle síntomas empeoran, llame de inmediato a pearson proveedor de [facility].   3. [place] frecuencia. Avila proveedor de [facility] puede ayudar a guiarle a [employer].     Si usted no ha estado expuesto o no tiene conocimiento de [place] expuesto al COVID-19, y está preocupado acerca de michelle síntomas, por favor contacte pacientes convalecientes es un componente de la jc que, en personas que se thornton recuperado de COVID-19, contiene anticuerpos en contra del virus.  Los anticuerpos en el plasma pueden ser usados diane tratamiento para pacientes en nuestra comunidad que thornton [employer]. pdf  [employer].All[employer].cy  http://www.[facility].[place].gov/topics-services/diseases-and-conditions/dise personas. • Lavarse las brendan frecuentemente. • Mantener al [facility] metros de distancia con otras personas. Referencias  Long haulers: Por qué algunas personas experimentan síntomas del coronavirus a itzel plazo. (8 de febrero de 2021).  [employer]